# Patient Record
Sex: FEMALE | Race: WHITE | Employment: OTHER | RURAL
[De-identification: names, ages, dates, MRNs, and addresses within clinical notes are randomized per-mention and may not be internally consistent; named-entity substitution may affect disease eponyms.]

---

## 2018-03-13 ENCOUNTER — OFFICE VISIT (OUTPATIENT)
Dept: FAMILY MEDICINE CLINIC | Age: 63
End: 2018-03-13

## 2018-03-13 VITALS
SYSTOLIC BLOOD PRESSURE: 125 MMHG | WEIGHT: 133 LBS | RESPIRATION RATE: 17 BRPM | HEART RATE: 98 BPM | BODY MASS INDEX: 22.71 KG/M2 | HEIGHT: 64 IN | TEMPERATURE: 98.3 F | DIASTOLIC BLOOD PRESSURE: 70 MMHG | OXYGEN SATURATION: 98 %

## 2018-03-13 DIAGNOSIS — F41.9 ANXIETY: ICD-10-CM

## 2018-03-13 DIAGNOSIS — R42 VERTIGO: Primary | ICD-10-CM

## 2018-03-13 DIAGNOSIS — Z12.39 SCREENING FOR MALIGNANT NEOPLASM OF BREAST: ICD-10-CM

## 2018-03-13 DIAGNOSIS — F33.9 RECURRENT DEPRESSION (HCC): ICD-10-CM

## 2018-03-13 DIAGNOSIS — Z78.0 POSTMENOPAUSAL: ICD-10-CM

## 2018-03-13 DIAGNOSIS — Z00.00 WELL WOMAN EXAM WITHOUT GYNECOLOGICAL EXAM: ICD-10-CM

## 2018-03-13 RX ORDER — MECLIZINE HYDROCHLORIDE 25 MG/1
25 TABLET ORAL
Qty: 30 TAB | Refills: 2 | Status: SHIPPED | OUTPATIENT
Start: 2018-03-13 | End: 2018-03-23

## 2018-03-13 RX ORDER — ESTRADIOL 0.1 MG/G
CREAM VAGINAL
Qty: 42.5 G | Refills: 11 | Status: SHIPPED | OUTPATIENT
Start: 2018-03-13 | End: 2018-05-16 | Stop reason: SDUPTHER

## 2018-03-13 RX ORDER — VALACYCLOVIR HYDROCHLORIDE 500 MG/1
500 TABLET, FILM COATED ORAL
Qty: 60 TAB | Refills: 2 | Status: SHIPPED | OUTPATIENT
Start: 2018-03-13 | End: 2018-05-16 | Stop reason: SDUPTHER

## 2018-03-13 RX ORDER — VALACYCLOVIR HYDROCHLORIDE 500 MG/1
TABLET, FILM COATED ORAL 2 TIMES DAILY
COMMUNITY
End: 2018-03-13 | Stop reason: SDUPTHER

## 2018-03-13 RX ORDER — CITALOPRAM 10 MG/1
10 TABLET ORAL DAILY
Qty: 30 TAB | Refills: 5 | Status: SHIPPED | OUTPATIENT
Start: 2018-03-13 | End: 2018-03-27 | Stop reason: SDUPTHER
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
## 2018-03-13 NOTE — PATIENT INSTRUCTIONS
Epley Maneuver at Home for Vertigo: Exercises  Your Care Instructions  Vertigo is a spinning or whirling sensation when you move your head. Your doctor may have moved you in different positions to help your vertigo get better faster. This is called the Epley maneuver. Your doctor also may have asked you to do these exercises at home. Do the exercises as often as your doctor recommends. If your vertigo is getting worse, your doctor may have you change the exercise or stop it. How to do the exercises  Step 1    1. Sit on the edge of a bed or sofa. Step 2    1. Turn your head 45 degrees in the direction your doctor told you to. This may be toward the ear that causes the most vertigo for you. Step 3    1. Tilt yourself backward until you are lying on your back. Your head should still be at a 45-degree turn. Your head should be about midway between looking straight ahead and looking out to your side. Hold for 30 seconds. If you have vertigo, stay in this position until it stops. Step 4    1. Turn your head 90 degrees toward the ear that has the least vertigo. The point of your chin should be over your shoulder. Hold for 30 seconds. Step 5    1. Roll onto the side of the ear with the least vertigo. You should now be looking at the floor. Follow-up care is a key part of your treatment and safety. Be sure to make and go to all appointments, and call your doctor if you are having problems. It's also a good idea to know your test results and keep a list of the medicines you take. Where can you learn more? Go to http://josé-nallely.info/. Enter 470 [id number] in the search box to learn more about \"Epley Maneuver at Home for Vertigo: Exercises. \"  Current as of: October 14, 2016  Content Version: 11.4  © 6067-3667 Healthwise, [employer]. Care instructions adapted under license by [employer] (which disclaims liability or warranty for this information).  If you have questions about a medical condition or this instruction, always ask your healthcare professional. [place] [address] any warranty or liability for your use of this information.

## 2018-03-13 NOTE — MR AVS SNAPSHOT
[address] [address] [phone] Patient: Jie Marshall MRN: [id number] AIA:[birth year] Visit Information Date & Time Provider Department Dept. Phone Encounter #  
 3/13/2018  2:00 PM Nina Soto NP [id number] [address] [id number] Follow-up Instructions Return in about 2 weeks (around 3/27/2018). Follow-up and Disposition History Allergies as of 3/13/2018  Review Complete On: 3/13/2018 By: Nina Soto NP Severity Noted Reaction Type Reactions Codeine  03/13/2018    Other (comments) Flu like symptoms Current Immunizations  Never Reviewed No immunizations on file. Not reviewed this visit You Were Diagnosed With   
  
 Codes Comments Vertigo    -  Primary ICD-10-CM: H09 ICD-9-CM: 780.4 Well woman exam without gynecological exam     ICD-10-CM: Z00.00 ICD-9-CM: V70.0 Anxiety     ICD-10-CM: F41.9 ICD-9-CM: 300.00 Recurrent depression ([facility]     ICD-10-CM: F33.9 ICD-9-CM: 296.30 Postmenopausal     ICD-10-CM: Z78.0 ICD-9-CM: V49.81 Screening for malignant neoplasm of breast     ICD-10-CM: Z12.31 
ICD-9-CM: V76.10 Vitals BP Pulse Temp Resp Height(growth percentile) Weight(growth percentile) 125/70 (BP 1 Location: Right arm, BP Patient Position: Sitting) 98 98.3 °F (36.8 °C) (Oral) 17 5' 3.5\" (1.613 m) 133 lb (60.3 kg) SpO2 BMI Smoking Status 98% 23.19 kg/m2 Never Smoker Vitals History BMI and BSA Data Body Mass Index Body Surface Area  
 23.19 kg/m 2 1.64 m 2 Preferred Pharmacy Pharmacy Name Phone [id number] [address] [place] [phone] Your Updated Medication List  
  
   
This list is accurate as of 3/13/18  2:44 PM.  Always use your most recent med list.  
  
  
  
  
 citalopram 10 mg tablet Commonly known as:  David Dempsey Take 1 Tab by mouth daily. Indications: ANXIETY WITH DEPRESSION  
  
 estradiol 0.01 % (0.1 mg/gram) vaginal cream  
Commonly known as:  ESTRACE Apply to affected area every day for the first 2 weeks, then 2 times per week after that  
  
 meclizine 25 mg tablet Commonly known as:  ANTIVERT Take 1 Tab by mouth three (3) times daily as needed for up to 10 days. Indications: Vertigo  
  
 valACYclovir 500 mg tablet Commonly known as:  VALTREX Take 1 Tab by mouth two (2) times daily as needed. Prescriptions Sent to Pharmacy Refills  
 estradiol (ESTRACE) 0.01 % (0.1 mg/gram) vaginal cream 11 Sig: Apply to affected area every day for the first 2 weeks, then 2 times per week after that Class: Normal  
 Pharmacy: [facility] #[id number] - [place] - [address] Ph #: [phone]  
 valACYclovir (VALTREX) 500 mg tablet 2 Sig: Take 1 Tab by mouth two (2) times daily as needed. Class: Normal  
 Pharmacy: [facility]/pharmacy #[id number] [place], [address] Ph #: [phone] Route: Oral  
 meclizine (ANTIVERT) 25 mg tablet 2 Sig: Take 1 Tab by mouth three (3) times daily as needed for up to 10 days. Indications: Vertigo Class: Normal  
 [id number] [place], [address] Ph #: [phone] Route: Oral  
 citalopram (CELEXA) 10 mg tablet 5 Sig: Take 1 Tab by mouth daily. Indications: ANXIETY WITH DEPRESSION Class: Normal  
 [id number] [place], [address] Ph #: [phone] Route: Oral  
  
We Performed the Following CBC WITH AUTOMATED DIFF [06468 CPT(R)] LIPID PANEL [95980 CPT(R)] METABOLIC PANEL, COMPREHENSIVE [27563 CPT(R)] REFERRAL TO GYNECOLOGY [REF30 Custom] Comments:  
 Wants to establish care with gynecology. Needs pap. TSH 3RD GENERATION [60828 CPT(R)] Follow-up Instructions Return in about 2 weeks (around 3/27/2018). To-Do List   
 03/13/2018 Imaging:  DEXA BONE DENSITY STUDY AXIAL   
  
 03/13/2018 Imaging:  SULEMA MAMMO BI SCREENING INCL CAD Referral Information Referral ID Referred By Referred To  
  
 [id number] [facility] [place], [address] Phone: [phone] Fax: [phone] Visits Status Start Date End Date 1 New Request 3/13/18 3/13/19 If your referral has a status of pending review or denied, additional information will be sent to support the outcome of this decision. Patient Instructions Epley Maneuver at Home for Vertigo: Exercises Your Care Instructions Vertigo is a spinning or whirling sensation when you move your head. Your doctor may have moved you in different positions to help your vertigo get better faster. This is called the Epley maneuver. Your doctor also may have asked you to do these exercises at home. Do the exercises as often as your doctor recommends. If your vertigo is getting worse, your doctor may have you change the exercise or stop it. How to do the exercises Step 1 1. Sit on the edge of a bed or sofa. Step 2 1. Turn your head 45 degrees in the direction your doctor told you to. This may be toward the ear that causes the most vertigo for you. Step 3 1. Tilt yourself backward until you are lying on your back. Your head should still be at a 45-degree turn. Your head should be about midway between looking straight ahead and looking out to your side. Hold for 30 seconds. If you have vertigo, stay in this position until it stops. Step 4 1. Turn your head 90 degrees toward the ear that has the least vertigo. The point of your chin should be over your shoulder. Hold for 30 seconds. Step 5 1. Roll onto the side of the ear with the least vertigo. You should now be looking at the floor. Follow-up care is a key part of your treatment and safety. Be sure to make and go to all appointments, and call your doctor if you are having problems. It's also a good idea to know your test results and keep a list of the medicines you take. Where can you learn more? Go to http://josé-nallely.info/. Enter 470 9627 in the search box to learn more about \"Epley Maneuver at Home for Vertigo: Exercises. \" Current as of: October 14, 2016 Content Version: 11.4 © 4502-2752 [employer]. Care instructions adapted under license by [employer] (which disclaims liability or warranty for this information). If you have questions about a medical condition or this instruction, always ask your healthcare professional. [address] any warranty or liability for your use of this information. Introducing [facility]! Ricardo Cooper introduces CelluF[employer] patient portal. Now you can access parts of your medical record, email your doctor's office, and request medication refills online. 1. In your internet browser, go to [email or web address] 2. Click on the First Time User? Click Here link in the Sign In box. You will see the New Member Sign Up page. 3. Enter your [employer] Access Code exactly as it appears below. You will not need to use this code after youve completed the sign-up process. If you do not sign up before the expiration date, you must request a new code. · [employer] Access Code: [email or web address] Expires: 6/11/2018  1:46 PM 
 
4. Enter the last four digits of your Social Security Number (xxxx) and Date of Birth (mm/dd/yyyy) as indicated and click Submit. You will be taken to the next sign-up page. 5. Create a Facisharet ID. This will be your [employer] login ID and cannot be changed, so think of one that is secure and easy to remember. 6. Create a Facisharet password. You can change your password at any time. 7. Enter your Password Reset Question and Answer. This can be used at a later time if you forget your password. 8. Enter your e-mail address. You will receive e-mail notification when new information is available in [address] 9. Click Sign Up. You can now view and download portions of your medical record. 10. Click the Download Summary menu link to download a portable copy of your medical information. If you have questions, please visit the Frequently Asked Questions section of the TEXbase website. Remember, [employer]base is NOT to be used for urgent needs. For medical emergencies, dial 911. Now available from your iPhone and Android! Please provide this summary of care documentation to your next provider. If you have any questions after today's visit, please call [phone].

## 2018-03-14 ENCOUNTER — DOCUMENTATION ONLY (OUTPATIENT)
Dept: FAMILY MEDICINE CLINIC | Age: 63
End: 2018-03-14

## 2018-03-14 LAB
ALBUMIN SERPL-MCNC: 4.6 G/DL (ref 3.6–4.8)
ALBUMIN/GLOB SERPL: 1.8 {RATIO} (ref 1.2–2.2)
ALP SERPL-CCNC: 75 IU/L (ref 39–117)
ALT SERPL-CCNC: 26 IU/L (ref 0–32)
AST SERPL-CCNC: 17 IU/L (ref 0–40)
BASOPHILS # BLD AUTO: 0 X10E3/UL (ref 0–0.2)
BASOPHILS NFR BLD AUTO: 0 %
BILIRUB SERPL-MCNC: 0.3 MG/DL (ref 0–1.2)
BUN SERPL-MCNC: 19 MG/DL (ref 8–27)
BUN/CREAT SERPL: 29 (ref 12–28)
CALCIUM SERPL-MCNC: 9.7 MG/DL (ref 8.7–10.3)
CHLORIDE SERPL-SCNC: 97 MMOL/L (ref 96–106)
CHOLEST SERPL-MCNC: 232 MG/DL (ref 100–199)
CO2 SERPL-SCNC: 23 MMOL/L (ref 18–29)
CREAT SERPL-MCNC: 0.65 MG/DL (ref 0.57–1)
EOSINOPHIL # BLD AUTO: 0 X10E3/UL (ref 0–0.4)
EOSINOPHIL NFR BLD AUTO: 0 %
ERYTHROCYTE [DISTWIDTH] IN BLOOD BY AUTOMATED COUNT: 13.2 % (ref 12.3–15.4)
GFR SERPLBLD CREATININE-BSD FMLA CKD-EPI: 108 ML/MIN/1.73
GFR SERPLBLD CREATININE-BSD FMLA CKD-EPI: 93 ML/MIN/1.73
GLOBULIN SER CALC-MCNC: 2.6 G/DL (ref 1.5–4.5)
GLUCOSE SERPL-MCNC: 71 MG/DL (ref 65–99)
HCT VFR BLD AUTO: 36.8 % (ref 34–46.6)
HDLC SERPL-MCNC: 98 MG/DL
HGB BLD-MCNC: 12.5 G/DL (ref 11.1–15.9)
IMM GRANULOCYTES # BLD: 0 X10E3/UL (ref 0–0.1)
IMM GRANULOCYTES NFR BLD: 0 %
LDLC SERPL CALC-MCNC: 122 MG/DL (ref 0–99)
LYMPHOCYTES # BLD AUTO: 1.4 X10E3/UL (ref 0.7–3.1)
LYMPHOCYTES NFR BLD AUTO: 19 %
MCH RBC QN AUTO: 30.8 PG (ref 26.6–33)
MCHC RBC AUTO-ENTMCNC: 34 G/DL (ref 31.5–35.7)
MCV RBC AUTO: 91 FL (ref 79–97)
MONOCYTES # BLD AUTO: 0.5 X10E3/UL (ref 0.1–0.9)
MONOCYTES NFR BLD AUTO: 6 %
NEUTROPHILS # BLD AUTO: 5.6 X10E3/UL (ref 1.4–7)
NEUTROPHILS NFR BLD AUTO: 75 %
PLATELET # BLD AUTO: 344 X10E3/UL (ref 150–379)
POTASSIUM SERPL-SCNC: 4.5 MMOL/L (ref 3.5–5.2)
PROT SERPL-MCNC: 7.2 G/DL (ref 6–8.5)
RBC # BLD AUTO: 4.06 X10E6/UL (ref 3.77–5.28)
SODIUM SERPL-SCNC: 137 MMOL/L (ref 134–144)
TRIGL SERPL-MCNC: 58 MG/DL (ref 0–149)
TSH SERPL DL<=0.005 MIU/L-ACNC: 1.23 UIU/ML (ref 0.45–4.5)
VLDLC SERPL CALC-MCNC: 12 MG/DL (ref 5–40)
WBC # BLD AUTO: 7.5 X10E3/UL (ref 3.4–10.8)

## 2018-03-15 ENCOUNTER — TELEPHONE (OUTPATIENT)
Dept: FAMILY MEDICINE CLINIC | Age: 63
End: 2018-03-15

## 2018-03-15 NOTE — TELEPHONE ENCOUNTER
----- Message from Vira Ritchie NP sent at 3/15/2018  7:28 AM EDT -----  Liver, kidneys and electrolytes look good. Cholesterol is decent. There is some room for improvement. Blood count looks good. Thyroid level is within range.

## 2018-03-15 NOTE — PROGRESS NOTES
Liver, kidneys and electrolytes look good. Cholesterol is decent. There is some room for improvement. Blood count looks good. Thyroid level is within range.

## 2018-03-15 NOTE — TELEPHONE ENCOUNTER
Unable to reach patient by phone to give lab results. Left message on voicemail to return my call at her earliest convenience.

## 2018-03-26 ENCOUNTER — TELEPHONE (OUTPATIENT)
Dept: FAMILY MEDICINE CLINIC | Age: 63
End: 2018-03-26

## 2018-03-26 DIAGNOSIS — M81.0 AGE-RELATED OSTEOPOROSIS WITHOUT CURRENT PATHOLOGICAL FRACTURE: Primary | ICD-10-CM

## 2018-03-26 RX ORDER — ALENDRONATE SODIUM 70 MG/1
70 TABLET ORAL
Qty: 12 TAB | Refills: 3 | Status: SHIPPED | OUTPATIENT
Start: 2018-03-26 | End: 2018-11-26 | Stop reason: SDUPTHER

## 2018-03-26 NOTE — TELEPHONE ENCOUNTER
----- Message from Maci Palmer NP sent at 3/26/2018 12:01 PM EDT -----  Bone density scan indicates that you currently fall into the osteoporosis category. Per your scoring, your risk of a major osteoporotic fracture over the next 10 years is 13% and your risk of hip fracture over the next 10 years is 3.1%. I'm going to send in Fosamax for you. Take it once a week with a full glass of water. We usually treat for 5 years.

## 2018-03-27 ENCOUNTER — OFFICE VISIT (OUTPATIENT)
Dept: FAMILY MEDICINE CLINIC | Age: 63
End: 2018-03-27

## 2018-03-27 VITALS
DIASTOLIC BLOOD PRESSURE: 62 MMHG | WEIGHT: 133.8 LBS | SYSTOLIC BLOOD PRESSURE: 120 MMHG | OXYGEN SATURATION: 99 % | BODY MASS INDEX: 22.84 KG/M2 | RESPIRATION RATE: 17 BRPM | HEIGHT: 64 IN | HEART RATE: 66 BPM

## 2018-03-27 DIAGNOSIS — M81.0 AGE-RELATED OSTEOPOROSIS WITHOUT CURRENT PATHOLOGICAL FRACTURE: ICD-10-CM

## 2018-03-27 DIAGNOSIS — F33.9 RECURRENT DEPRESSION (HCC): ICD-10-CM

## 2018-03-27 DIAGNOSIS — Z00.00 MEDICARE ANNUAL WELLNESS VISIT, INITIAL: Primary | ICD-10-CM

## 2018-03-27 RX ORDER — CITALOPRAM 20 MG/1
20 TABLET, FILM COATED ORAL DAILY
Qty: 90 TAB | Refills: 1 | Status: SHIPPED | OUTPATIENT
Start: 2018-03-27 | End: 2018-05-16 | Stop reason: SDUPTHER

## 2018-03-27 NOTE — ACP (ADVANCE CARE PLANNING)
Patient states she has an advanced directive and will bring it in on her next visit to be scanned into her chart.

## 2018-03-27 NOTE — MR AVS SNAPSHOT
[address] [facility] 
 
 
 [address] [address] [facility],5Th [address] [address] [phone] Patient: Jakub Sahni MRN: [id number] Visit Information Date & Time Provider Department Dept. Phone Encounter #  
 3/27/2018 11:00 AM Kuldeep Valero NP Florinda Mckenna [id number] Follow-up Instructions Return in about 6 months (around 9/27/2018). Follow-up and Disposition History Your Appointments 9/27/2018  1:00 PM  
ESTABLISHED PATIENT with SOLIS Kaiser 38 ([facility]) Appt Note: 6mo fu  
 [address] [address] [phone]  
  
   
 [address] [address] Upcoming Health Maintenance Date Due Hepatitis C Screening 1955 DTaP/Tdap/Td series (1 - Tdap) 2/12/1974 BREAST CANCER SCRN MAMMOGRAM 2/12/2003 FOBT Q 1 YEAR AGE 50-75 2/12/2003 ZOSTER VACCINE AGE 60> 12/12/2012 Influenza Age 5 to Adult 8/1/2017 GLAUCOMA SCREENING Q2Y 2/12/2018 Pneumococcal 65+ Low/Medium Risk (1 of 2 - PCV13) 2/12/2018 MEDICARE YEARLY EXAM 3/20/2018 Allergies as of 3/27/2018  Review Complete On: 3/27/2018 By: Kuldeep Valero NP Severity Noted Reaction Type Reactions Codeine  03/13/2018    Other (comments) Flu like symptoms Current Immunizations  Never Reviewed No immunizations on file. Not reviewed this visit You Were Diagnosed With   
  
 Codes Comments Medicare annual wellness visit, initial    -  Primary ICD-10-CM: Z00.00 ICD-9-CM: V70.0 Recurrent depression ([facility] [place] [address]     ICD-10-CM: F33.9 ICD-9-CM: 296.30 Age-related osteoporosis without current pathological fracture     ICD-10-CM: M81.0 ICD-9-CM: 733.01 Vitals BP Pulse Resp Height(growth percentile) Weight(growth percentile) SpO2  
 120/62 (BP 1 Location: Left arm, BP Patient Position: Sitting) 66 17 5' 3.5\" (1.613 m) 133 lb 12.8 oz (60.7 kg) 99% BMI OB Status Smoking Status 23.33 kg/m2 Hysterectomy Never Smoker Vitals History BMI and BSA Data Body Mass Index Body Surface Area  
 23.33 kg/m 2 1.65 m 2 Preferred Pharmacy Pharmacy Name Phone [facility]/PHARMACY #[id number] [place] [phone] Your Updated Medication List  
  
   
This list is accurate as of 3/27/18 12:09 PM.  Always use your most recent med list.  
  
  
  
  
 alendronate 70 mg tablet Commonly known as:  FOSAMAX Take 1 Tab by mouth every seven (7) days. Indications: POST-MENOPAUSAL OSTEOPOROSIS  
  
 citalopram 20 mg tablet Commonly known as:  Doyne Victor M Take 1 Tab by mouth daily. Indications: ANXIETY WITH DEPRESSION, New higher dose  
  
 estradiol 0.01 % (0.1 mg/gram) vaginal cream  
Commonly known as:  ESTRACE Apply to affected area every day for the first 2 weeks, then 2 times per week after that  
  
 valACYclovir 500 mg tablet Commonly known as:  VALTREX Take 1 Tab by mouth two (2) times daily as needed. Prescriptions Sent to Pharmacy Refills  
 citalopram (CELEXA) 20 mg tablet 1 Sig: Take 1 Tab by mouth daily. Indications: ANXIETY WITH DEPRESSION, New higher dose Class: Normal  
 Pharmacy: [facility]/pharmacy #[id number] [address] [place] Ph #: [phone] Route: Oral  
  
We Performed the Following AMB POC EKG ROUTINE W/ 12 LEADS, INTER & REP [70765 CPT(R)] Follow-up Instructions Return in about 6 months (around 9/27/2018). Patient Instructions The best way to stay healthy is to live a healthy lifestyle. A healthy lifestyle includes regular exercise, eating a well-balanced diet, keeping a healthy weight and not smoking. Regular physical exams and screening tests are another important way to take care of yourself.  Preventive exams provided by health care providers can find health problems early when treatment works best and can keep you from getting certain diseases or illnesses. Preventive services include exams, lab tests, screenings, shots, monitoring and information to help you take care of your own health. All people over 65 should have a pneumonia shot. Pneumonia shots are usually only needed once in a lifetime unless your doctor decides differently. In addition to your physical exam, some screening tests are recommended: 
 
All people over 65 should have a yearly flu shot. People over 65 are at medium to high risk for Hepatitis B. Three shots are needed for complete protection. Bone mass measurement (dexa scan) is recommended every two years. Diabetes Mellitus screening is recommended every year. Glaucoma is an eye disease caused by high pressure in the eye. An eye exam is recommended every year. Cardiovascular screening tests that check your cholesterol and other blood fat (lipid) levels are recommended every five years. Colorectal Cancer screening tests help to find pre-cancerous polyps (growths in the colon) so they can be removed before they turn into cancer. Tests ordered for screening depend on your personal and family history risk factors. Prostate Cancer Screening (annually up to age 76) Screening for breast cancer is recommended yearly with a Mammogram. 
 
Screening for cervical and vaginal cancer is recommended with a pelvic and Pap test every two years. However if you have had an abnormal pap in the past  three years or at high risk for cervical or vaginal cancer Medicare will cover a pap test and a pelvic exam every year. Here is a list of your current Health Maintenance items with a due date: 
Health Maintenance Due Topic Date Due  
 Hepatitis C Test  1955  DTaP/Tdap/Td  (1 - Tdap) 02/12/1974  Breast Cancer Screening  02/12/2003  Stool testing for trace blood  02/12/2003  Shingles Vaccine  12/12/2012  Flu Vaccine  08/01/2017  Glaucoma Screening   02/12/2018  Pneumococcal Vaccine (1 of 2 - PCV13) 02/12/2018 [facility] Annual Well Visit  03/20/2018 Osteoporosis: Care Instructions Your Care Instructions Osteoporosis causes bones to become thin and weak. It is much more common in women than in men. Osteoporosis may be very advanced before you know you have it. Sometimes the first sign is a broken bone in the hip, spine, or wrist or sudden pain in your middle or lower back. Follow-up care is a key part of your treatment and safety. Be sure to make and go to all appointments, and call your doctor if you are having problems. It's also a good idea to know your test results and keep a list of the medicines you take. How can you care for yourself at home? · Your doctor may prescribe a bisphosphonate, such as risedronate (Actonel) or alendronate (Fosamax), for osteoporosis. If you are taking one of these medicines by mouth: 
¨ Take your medicine with a full glass of water when you first get up in the morning. ¨ Do not lie down, eat, drink a beverage, or take any other medicine for at least 30 minutes after taking the drug. This helps prevent stomach problems. ¨ Do not take your medicine late in the day if you forgot to take it in the morning. Skip it, and take the usual dose the next morning. ¨ If you have side effects, tell your doctor. He or she may prescribe another medicine. · Get enough calcium and vitamin D. The [place] of Medicine recommends adults younger than age 46 need 1,000 mg of calcium and 600 IU of vitamin D each day. Women ages 46 to 79 need 1,200 mg of calcium and 600 IU of vitamin D each day. Men ages 46 to 79 need 1,000 mg of calcium and 600 IU of vitamin D each day. Adults 71 and older need 1,200 mg of calcium and 800 IU of vitamin D each day.  
¨ Eat foods rich in calcium, like yogurt, cheese, milk, and dark green vegetables. This is a good way to get the calcium you need. You can get vitamin D from eggs, fatty fish, cereal, and milk. ¨ Talk to your doctor about taking a calcium plus vitamin D supplement. Be careful, though. Adults ages 23 to 48 should not get more than 2,500 mg of calcium and 4,000 IU of vitamin D each day, whether it is from supplements and/or food. Adults ages 46 and older should not get more than 2,000 mg of calcium and 4,000 IU of vitamin D each day from supplements and/or food. · Limit alcohol to 2 drinks a day for men and 1 drink a day for women. Too much alcohol can cause health problems. · Do not smoke. Smoking puts you at a much higher risk for osteoporosis. If you need help quitting, talk to your doctor about stop-smoking programs and medicines. These can increase your chances of quitting for good. · Get regular bone-building exercise. Weight-bearing and resistance exercises keep bones healthy by working the muscles and bones against gravity. Start out at an exercise level that feels right for you. Add a little at a time until you can do the following: ¨ Do 30 minutes of weight-bearing exercise on most days of the week. Walking, jogging, stair climbing, and dancing are good choices. ¨ Do resistance exercises with weights or elastic bands 2 to 3 days a week. · Reduce your risk of falls: 
¨ Wear supportive shoes with low heels and nonslip soles. ¨ Use a cane or walker, if you need it. Use shower chairs and bath benches. Put in handrails on stairways, around your shower or tub area, and near the toilet. ¨ Keep stairs, porches, and walkways well lit. Use night-lights. ¨ Remove throw rugs and other objects that are in the way. ¨ Avoid icy, wet, or slippery surfaces. ¨ Keep a cordless phone and a flashlight with new batteries by your bed. When should you call for help? Watch closely for changes in your health, and be sure to contact your doctor if you have any problems. Where can you learn more? Go to http://josé-nallely.info/. Enter K100 in the search box to learn more about \"Osteoporosis: Care Instructions. \" Current as of: May 12, 2017 Content Version: 11.4 © 4392-6921 [employer]. Care instructions adapted under license by [employer] (which disclaims liability or warranty for this information). If you have questions about a medical condition or this instruction, always ask your healthcare professional. [address] any warranty or liability for your use of this information. Patient Instructions History Introducing [facility]! Dear Gaby Espinoza: Thank you for requesting a Endgame account. Our records indicate that you already have an active Endgame account. You can access your account anytime at [email or web address] Did you know that you can access your hospital and ER discharge instructions at any time in Endgame? You can also review all of your test results from your hospital stay or ER visit. Additional Information If you have questions, please visit the Frequently Asked Questions section of the Endgame website at [email or web address] Remember, Endgame is NOT to be used for urgent needs. For medical emergencies, dial 911. Now available from your iPhone and Android! Please provide this summary of care documentation to your next provider. Your primary care clinician is listed as Amanda Clause. If you have any questions after today's visit, please call [phone].

## 2018-03-27 NOTE — PATIENT INSTRUCTIONS
The best way to stay healthy is to live a healthy lifestyle. A healthy lifestyle includes regular exercise, eating a well-balanced diet, keeping a healthy weight and not smoking. Regular physical exams and screening tests are another important way to take care of yourself. Preventive exams provided by health care providers can find health problems early when treatment works best and can keep you from getting certain diseases or illnesses. Preventive services include exams, lab tests, screenings, shots, monitoring and information to help you take care of your own health. All people over 65 should have a pneumonia shot. Pneumonia shots are usually only needed once in a lifetime unless your doctor decides differently. In addition to your physical exam, some screening tests are recommended:    All people over 65 should have a yearly flu shot. People over 65 are at medium to high risk for Hepatitis B. Three shots are needed for complete protection. Bone mass measurement (dexa scan) is recommended every two years. Diabetes Mellitus screening is recommended every year. Glaucoma is an eye disease caused by high pressure in the eye. An eye exam is recommended every year. Cardiovascular screening tests that check your cholesterol and other blood fat (lipid) levels are recommended every five years. Colorectal Cancer screening tests help to find pre-cancerous polyps (growths in the colon) so they can be removed before they turn into cancer. Tests ordered for screening depend on your personal and family history risk factors. Prostate Cancer Screening (annually up to age 76)    Screening for breast cancer is recommended yearly with a Mammogram.    Screening for cervical and vaginal cancer is recommended with a pelvic and Pap test every two years.  However if you have had an abnormal pap in the past  three years or at high risk for cervical or vaginal cancer Medicare will cover a pap test and a pelvic exam every year. Here is a list of your current Health Maintenance items with a due date:  Health Maintenance Due   Topic Date Due    Hepatitis C Test  1955    DTaP/Tdap/Td  (1 - Tdap) 02/12/1974    Breast Cancer Screening  02/12/2003    Stool testing for trace blood  02/12/2003    Shingles Vaccine  12/12/2012    Flu Vaccine  08/01/2017    Glaucoma Screening   02/12/2018    Pneumococcal Vaccine (1 of 2 - PCV13) 02/12/2018    Annual Well Visit  03/20/2018          Osteoporosis: Care Instructions  Your Care Instructions    Osteoporosis causes bones to become thin and weak. It is much more common in women than in men. Osteoporosis may be very advanced before you know you have it. Sometimes the first sign is a broken bone in the hip, spine, or wrist or sudden pain in your middle or lower back. Follow-up care is a key part of your treatment and safety. Be sure to make and go to all appointments, and call your doctor if you are having problems. It's also a good idea to know your test results and keep a list of the medicines you take. How can you care for yourself at home? · Your doctor may prescribe a bisphosphonate, such as risedronate (Actonel) or alendronate (Fosamax), for osteoporosis. If you are taking one of these medicines by mouth:  ¨ Take your medicine with a full glass of water when you first get up in the morning. ¨ Do not lie down, eat, drink a beverage, or take any other medicine for at least 30 minutes after taking the drug. This helps prevent stomach problems. ¨ Do not take your medicine late in the day if you forgot to take it in the morning. Skip it, and take the usual dose the next morning. ¨ If you have side effects, tell your doctor. He or she may prescribe another medicine. · Get enough calcium and vitamin D. The [place] of Medicine recommends adults younger than age 46 need 1,000 mg of calcium and 600 IU of vitamin D each day.  Women ages 46 to 79 need 1,200 mg of calcium and 600 IU of vitamin D each day. Men ages 46 to 79 need 1,000 mg of calcium and 600 IU of vitamin D each day. Adults 71 and older need 1,200 mg of calcium and 800 IU of vitamin D each day. ¨ Eat foods rich in calcium, like yogurt, cheese, milk, and dark green vegetables. This is a good way to get the calcium you need. You can get vitamin D from eggs, fatty fish, cereal, and milk. ¨ Talk to your doctor about taking a calcium plus vitamin D supplement. Be careful, though. Adults ages 23 to 48 should not get more than 2,500 mg of calcium and 4,000 IU of vitamin D each day, whether it is from supplements and/or food. Adults ages 46 and older should not get more than 2,000 mg of calcium and 4,000 IU of vitamin D each day from supplements and/or food. · Limit alcohol to 2 drinks a day for men and 1 drink a day for women. Too much alcohol can cause health problems. · Do not smoke. Smoking puts you at a much higher risk for osteoporosis. If you need help quitting, talk to your doctor about stop-smoking programs and medicines. These can increase your chances of quitting for good. · Get regular bone-building exercise. Weight-bearing and resistance exercises keep bones healthy by working the muscles and bones against gravity. Start out at an exercise level that feels right for you. Add a little at a time until you can do the following:  ¨ Do 30 minutes of weight-bearing exercise on most days of the week. Walking, jogging, stair climbing, and dancing are good choices. ¨ Do resistance exercises with weights or elastic bands 2 to 3 days a week. · Reduce your risk of falls:  ¨ Wear supportive shoes with low heels and nonslip soles. ¨ Use a cane or walker, if you need it. Use shower chairs and bath benches. Put in handrails on stairways, around your shower or tub area, and near the toilet. ¨ Keep stairs, porches, and walkways well lit. Use night-lights.   ¨ Remove throw rugs and other objects that are in the way.  ¨ Avoid icy, wet, or slippery surfaces. ¨ Keep a cordless phone and a flashlight with new batteries by your bed. When should you call for help? Watch closely for changes in your health, and be sure to contact your doctor if you have any problems. Where can you learn more? Go to http://josé-nallely.info/. Enter K100 in the search box to learn more about \"Osteoporosis: Care Instructions. \"  Current as of: May 12, 2017  Content Version: 11.4  © 0588-6544 [employer]. Care instructions adapted under license by [email or web address] (which disclaims liability or warranty for this information). If you have questions about a medical condition or this instruction, always ask your healthcare professional. [address] any warranty or liability for your use of this information.

## 2018-03-27 NOTE — PROGRESS NOTES
Jie Marshall is a 70 y.o. female and presents for annual Medicare Wellness Visit. Problem List: Reviewed with patient and discussed risk factors. Patient Active Problem List   Diagnosis Code    Vertigo R42    [facility] F33.9    Age-related osteoporosis without current pathological fracture M81.0       Current medical providers:  Patient Care Team:  Nina Soto NP as PCP - General (Nurse Practitioner)    PSH: Reviewed with patient  Past Surgical History:   Procedure Laterality Date    HX KNEE REPLACEMENT Left 05/05/2009        SH: Reviewed with patient  Social History   Substance Use Topics    Smoking status: Never Smoker    Smokeless tobacco: Never Used    Alcohol use No       FH: Reviewed with patient  Family History   Problem Relation Age of Onset    Heart Attack Father     Cancer Sister        Medications/Allergies: Reviewed with patient  Current Outpatient Prescriptions on File Prior to Visit   Medication Sig Dispense Refill    estradiol (ESTRACE) 0.01 % (0.1 mg/gram) vaginal cream Apply to affected area every day for the first 2 weeks, then 2 times per week after that 42.5 g 11    valACYclovir (VALTREX) 500 mg tablet Take 1 Tab by mouth two (2) times daily as needed. 60 Tab 2    citalopram (CELEXA) 10 mg tablet Take 1 Tab by mouth daily. Indications: ANXIETY WITH DEPRESSION 30 Tab 5    alendronate (FOSAMAX) 70 mg tablet Take 1 Tab by mouth every seven (7) days. Indications: POST-MENOPAUSAL OSTEOPOROSIS 12 Tab 3     No current facility-administered medications on file prior to visit. Allergies   Allergen Reactions    Codeine Other (comments)     Flu like symptoms       Objective:  Visit Vitals    /62 (BP 1 Location: Left arm, BP Patient Position: Sitting)    Pulse 66    Resp 17    Ht 5' 3.5\" (1.613 m)    Wt 133 lb 12.8 oz (60.7 kg)    SpO2 99%    BMI 23.33 kg/m2    Body mass index is 23.33 kg/(m^2).     Assessment of cognitive impairment: Alert and oriented x 3    Depression Screen:   PHQ over the last two weeks 3/27/2018   Little interest or pleasure in doing things Not at all   Feeling down, depressed or hopeless Not at all   Total Score PHQ 2 0       Fall Risk Assessment:    Fall Risk Assessment, last 12 mths 3/27/2018   Able to walk? Yes   Fall in past 12 months? No       Functional Ability:   Does the patient exhibit a steady gait? yes   How long did it take the patient to get up and walk from a sitting position? 5 seconds   Is the patient self reliant?  (ie can do own laundry, meals, household chores)  yes     Does the patient handle his/her own medications? yes     Does the patient handle his/her own money? yes     Is the patients home safe (ie good lighting, handrails on stairs and bath, etc.)? yes     Did you notice or did patient express any hearing difficulties? no     Did you notice or did patient express any vision difficulties?   no     Were distance and reading eye charts used? no       Advance Care Planning:   Patient was offered the opportunity to discuss advance care planning:  yes     Does patient have an Advance Directive:  yes   If no, did you provide information on Caring Connections? yes       Plan:      Orders Placed This Encounter    AMB POC EKG ROUTINE W/ 12 LEADS, INTER & REP       Health Maintenance   Topic Date Due    Hepatitis C Screening  1955    DTaP/Tdap/Td series (1 - Tdap) 02/12/1974    BREAST CANCER SCRN MAMMOGRAM  02/12/2003    FOBT Q 1 YEAR AGE 50-75  02/12/2003    ZOSTER VACCINE AGE 60>  12/12/2012    Influenza Age 5 to Adult  08/01/2017    GLAUCOMA SCREENING Q2Y  02/12/2018    Pneumococcal 65+ Low/Medium Risk (1 of 2 - PCV13) 02/12/2018    MEDICARE YEARLY EXAM  03/20/2018    Bone Densitometry (Dexa) Screening  Completed       *Patient verbalized understanding and agreement with the plan. A copy of the After Visit Summary with personalized health plan was given to the patient today. ____________________________________________________________________________________________________________      Chief Complaint   Patient presents with    Annual Wellness Visit         HPI:      Nedra Carrasco is a 70 y.o. female. Moved here a month ago from [place]. Originally from [place]. 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 Left TKR   Diagnosed with Lyme's Disease in 2017    Osteoporosis:  Started on Fosamax 3/2018. Major osteoporotic fracture over the next 10 years is 13% and your risk of hip fracture over the next 10 years is 3.1%. Anxiety/Depression: She has some issues with anxiety and depression. Has had it her all life. She did some binge eating in the past to cope. Also drank in the past.  Got a DUI. 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 Has quit for a while, but then will start again. 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 Sister  of lung cancer  Father had MI (age 80)  [de-identified] with breast cancer    New Issues:  Has picked up her medication for osteoporosis. Has been doing Ok on the Celexa. Her \"dark cloud\" has seemed slightly lighter in the morning. Her vertigo and vaginal dryness are much better. Plan to check Vit. D with next labs. Allergies   Allergen Reactions    Codeine Other (comments)     Flu like symptoms       Current Outpatient Prescriptions   Medication Sig    estradiol (ESTRACE) 0.01 % (0.1 mg/gram) vaginal cream Apply to affected area every day for the first 2 weeks, then 2 times per week after that    valACYclovir (VALTREX) 500 mg tablet Take 1 Tab by mouth two (2) times daily as needed.  citalopram (CELEXA) 10 mg tablet Take 1 Tab by mouth daily. Indications: ANXIETY WITH DEPRESSION    alendronate (FOSAMAX) 70 mg tablet Take 1 Tab by mouth every seven (7) days. Indications: POST-MENOPAUSAL OSTEOPOROSIS     No current facility-administered medications for this visit.         Past Medical History:   Diagnosis Date    Menopause        Past Surgical History:   Procedure Laterality Date    HX KNEE REPLACEMENT Left 05/05/2009       Social History     Social History    Marital status: SINGLE     Spouse name: N/A    Number of children: N/A    Years of education: N/A     Social History Main Topics    Smoking status: Never Smoker    Smokeless tobacco: Never Used    Alcohol use No    Drug use: No    Sexual activity: Yes     Partners: Male     Other Topics Concern     Service No    Blood Transfusions No    Caffeine Concern No    Occupational Exposure No    Hobby Hazards No    Sleep Concern No    Stress Concern No    Weight Concern No    Special Diet No    Back Care Yes    Exercise Yes    Bike Helmet Yes    Seat Belt Yes    Self-Exams Yes     Social History Narrative       Family History   Problem Relation Age of Onset    Heart Attack Father     Cancer Sister        Above history reviewed. ROS:  Denies fever, chills, cough, chest pain, SOB,  nausea, vomiting, or diarrhea. Denies wt loss, wt gain, hemoptysis, hematochezia or melena. Physical Examination:    /62 (BP 1 Location: Left arm, BP Patient Position: Sitting)  Pulse 66  Resp 17  Ht 5' 3.5\" (1.613 m)  Wt 133 lb 12.8 oz (60.7 kg)  SpO2 99%  BMI 23.33 kg/m2    General: Alert and Ox3, Fluent speech  Neck:  Supple, no adenopathy, JVD, mass or bruit  Chest:  Clear to Ausculation, without wheezes, rales, rubs or ronchi  Cardiac: RRR  Extremities:  No cyanosis, clubbing or edema  Neurologic:  Ambulatory without assist, CN 2-12 grossly intact. Moves all extremities. Skin: no rash  Lymphadenopathy: no cervical or supraclavicular nodes    ASSESSMENT AND PLAN:     1. Medicare annual wellness visit, initial  Obtaining records from outside facilities  - AMB POC EKG ROUTINE W/ 12 LEADS, INTER & REP    2. Recurrent depression ([facility]  Increasing dose  - citalopram (CELEXA) 20 mg tablet; Take 1 Tab by mouth daily.  Indications: ANXIETY WITH DEPRESSION, New higher dose  Dispense: 90 Tab; Refill: 1    3.  Age-related osteoporosis without current pathological fracture  educated on medication    RTC PRN    Frank Dean NP

## 2018-03-28 ENCOUNTER — TELEPHONE (OUTPATIENT)
Dept: FAMILY MEDICINE CLINIC | Age: 63
End: 2018-03-28

## 2018-03-28 NOTE — TELEPHONE ENCOUNTER
[phone] contact # per Jose Course, please have [place] give me a call as I have a few questions concerning visit on yesterday, 03/27/2018.     Thanks,

## 2018-03-30 ENCOUNTER — TELEPHONE (OUTPATIENT)
Dept: FAMILY MEDICINE CLINIC | Age: 63
End: 2018-03-30

## 2018-03-30 NOTE — TELEPHONE ENCOUNTER
Would like to have Deb Verduzco to call her.  She has a couple of questions MG disintegrating tablet Take 1 tablet by mouth every 8 hours as needed for Nausea 15 tablet 0      No current facility-administered medications for this visit. No Known Allergies     Review of Systems:       Review of Systems   Constitutional: Negative for activity change, chills and diaphoresis. HENT: Negative for congestion, drooling and facial swelling. Eyes: Negative for pain and discharge. Respiratory: Negative for apnea, shortness of breath and stridor. Cardiovascular: Negative for chest pain, palpitations and leg swelling. Gastrointestinal: Negative for abdominal distention, abdominal pain and blood in stool. Endocrine: Negative for cold intolerance, heat intolerance and polydipsia. Genitourinary: Negative for difficulty urinating, dysuria and flank pain. Musculoskeletal: Negative for arthralgias, back pain and joint swelling. Skin: Positive for wound. Allergic/Immunologic: Negative. Neurological: Negative for dizziness, facial asymmetry and light-headedness. Hematological: Negative. Psychiatric/Behavioral: Negative for agitation, confusion and decreased concentration.      OBJECTIVE:  Physical Exam:    /78   Ht 6' 0.01\" (1.829 m)   Wt 160 lb 0.9 oz (72.6 kg)   BMI 21.70 kg/m²       Physical Exam   Constitutional: He is oriented to person, place, and time. He appears well-developed and well-nourished. No distress. Abdominal: Soft. Bowel sounds are normal. He exhibits no distension and no mass. There is no tenderness. There is no rebound and no guarding. Neurological: He is alert and oriented to person, place, and time. Skin: Skin is warm and dry. No rash noted. He is not diaphoretic. No erythema. No pallor. Left medial buttock Abscess healed, not draining purulent material, erythema minimal, minimal inflammation surrounding.     Psychiatric: He has a normal mood and affect.      ASSESSMENT:  abscess     PLAN:  Treatment:   Increase activity as

## 2018-05-08 ENCOUNTER — TELEPHONE (OUTPATIENT)
Dept: FAMILY MEDICINE CLINIC | Age: 63
End: 2018-05-08

## 2018-05-08 DIAGNOSIS — G89.29 OTHER CHRONIC PAIN: Primary | ICD-10-CM

## 2018-05-08 NOTE — TELEPHONE ENCOUNTER
[phone] contact # per Jose F Huff please ask Mathew Loo to complete a referral  to a back specialists in the area. Should she have any questions, please give me a call back @ [phone].     Thanks,

## 2018-05-16 DIAGNOSIS — F33.9 RECURRENT DEPRESSION (HCC): ICD-10-CM

## 2018-05-16 DIAGNOSIS — Z78.0 POSTMENOPAUSAL: ICD-10-CM

## 2018-05-17 DIAGNOSIS — Z78.0 POSTMENOPAUSAL: ICD-10-CM

## 2018-05-17 DIAGNOSIS — F33.9 RECURRENT DEPRESSION (HCC): ICD-10-CM

## 2018-05-17 RX ORDER — ESTRADIOL 0.1 MG/G
CREAM VAGINAL
Qty: 42.5 G | Refills: 11 | Status: SHIPPED | OUTPATIENT
Start: 2018-05-17 | End: 2018-12-13 | Stop reason: SDUPTHER

## 2018-05-17 RX ORDER — ESTRADIOL 0.1 MG/G
CREAM VAGINAL
Qty: 42.5 G | Refills: 11 | Status: SHIPPED | OUTPATIENT
Start: 2018-05-17 | End: 2018-05-17 | Stop reason: SDUPTHER

## 2018-05-17 RX ORDER — CITALOPRAM 20 MG/1
20 TABLET, FILM COATED ORAL DAILY
Qty: 90 TAB | Refills: 1 | Status: SHIPPED | OUTPATIENT
Start: 2018-05-17 | End: 2018-05-24 | Stop reason: SDUPTHER

## 2018-05-17 RX ORDER — VALACYCLOVIR HYDROCHLORIDE 500 MG/1
500 TABLET, FILM COATED ORAL
Qty: 60 TAB | Refills: 2 | Status: SHIPPED | OUTPATIENT
Start: 2018-05-17 | End: 2018-05-17 | Stop reason: SDUPTHER

## 2018-05-17 RX ORDER — VALACYCLOVIR HYDROCHLORIDE 500 MG/1
500 TABLET, FILM COATED ORAL
Qty: 60 TAB | Refills: 2 | Status: SHIPPED | OUTPATIENT
Start: 2018-05-17 | End: 2020-12-08 | Stop reason: SDUPTHER

## 2018-05-17 RX ORDER — CITALOPRAM 20 MG/1
20 TABLET, FILM COATED ORAL DAILY
Qty: 90 TAB | Refills: 1 | Status: SHIPPED | OUTPATIENT
Start: 2018-05-17 | End: 2018-05-17 | Stop reason: SDUPTHER

## 2018-05-24 ENCOUNTER — TELEPHONE (OUTPATIENT)
Dept: FAMILY MEDICINE CLINIC | Age: 63
End: 2018-05-24

## 2018-05-24 DIAGNOSIS — F33.9 RECURRENT DEPRESSION (HCC): ICD-10-CM

## 2018-05-24 RX ORDER — CITALOPRAM 20 MG/1
20 TABLET, FILM COATED ORAL DAILY
Qty: 90 TAB | Refills: 1 | Status: SHIPPED | OUTPATIENT
Start: 2018-05-24 | End: 2018-10-01 | Stop reason: SDUPTHER

## 2018-05-28 ENCOUNTER — TELEPHONE (OUTPATIENT)
Dept: FAMILY MEDICINE CLINIC | Age: 63
End: 2018-05-28

## 2018-05-28 NOTE — TELEPHONE ENCOUNTER
Pt calling concerned of recent tick bite. Unsure how long attached. Pulled off 10 days ago. Started to get sxs that are mild, but similar to her h/o Lyme Dz. Has noticed a gradual onset of sporadic nonspecific jt aches, feeling tired, bite is a little red but not quite spread just yet, no large or diffuse rash. No fever or other sxs. Just wanted to know what protocol is here. Previously tx in [place]. Wants to come in tomorrow for potential treatment, understands testing probably not helpful at this point and would not want to wait for labs and sxs to get worse prior to tx. Will call first thing in AM for appt and will go to Urgent care for tx today if feels her sxs are getting any worse or decides to try to get abx today (told sooner the better).

## 2018-05-29 ENCOUNTER — OFFICE VISIT (OUTPATIENT)
Dept: FAMILY MEDICINE CLINIC | Age: 63
End: 2018-05-29

## 2018-05-29 ENCOUNTER — TELEPHONE (OUTPATIENT)
Dept: FAMILY MEDICINE CLINIC | Age: 63
End: 2018-05-29

## 2018-05-29 VITALS
TEMPERATURE: 97.8 F | RESPIRATION RATE: 17 BRPM | HEIGHT: 64 IN | SYSTOLIC BLOOD PRESSURE: 115 MMHG | DIASTOLIC BLOOD PRESSURE: 65 MMHG | BODY MASS INDEX: 22.43 KG/M2 | WEIGHT: 131.4 LBS | OXYGEN SATURATION: 98 % | HEART RATE: 73 BPM

## 2018-05-29 DIAGNOSIS — A69.20 LYME DISEASE: Primary | ICD-10-CM

## 2018-05-29 DIAGNOSIS — M54.9 BACK PAIN, UNSPECIFIED BACK LOCATION, UNSPECIFIED BACK PAIN LATERALITY, UNSPECIFIED CHRONICITY: ICD-10-CM

## 2018-05-29 RX ORDER — DOXYCYCLINE 100 MG/1
100 TABLET ORAL 2 TIMES DAILY
Qty: 42 TAB | Refills: 0 | Status: SHIPPED | OUTPATIENT
Start: 2018-05-29 | End: 2018-06-19

## 2018-05-29 NOTE — MR AVS SNAPSHOT
[address] [facility] [address] 
 
 
 [address] [address] [facility],[address] [address] [phone] Patient: Gerald Dave MRN: [id number] Visit Information Date & Time Provider Department Dept. Phone Encounter #  
 5/29/2018  1:15 PM Yolanda Cooney NP Florinda Mckenna [id number] Follow-up Instructions Return if symptoms worsen or fail to improve. Follow-up and Disposition History Your Appointments 9/27/2018  1:00 PM  
ESTABLISHED PATIENT with SOLIS Fitch 38 [facility] Appt Note: 6mo fu  
 [address] [address] [phone]  
  
   
 [address] [address] Upcoming Health Maintenance Date Due DTaP/Tdap/Td series (1 - Tdap) 2/12/1974 FOBT Q 1 YEAR AGE 50-75 2/12/2003 ZOSTER VACCINE AGE 60> 12/12/2012 GLAUCOMA SCREENING Q2Y 2/12/2018 Pneumococcal 65+ Low/Medium Risk (1 of 2 - PCV13) 2/12/2018 Influenza Age 5 to Adult 8/1/2018 MEDICARE YEARLY EXAM 3/28/2019 BREAST CANCER SCRN MAMMOGRAM 3/23/2020 Allergies as of 5/29/2018  Review Complete On: 5/29/2018 By: Yolanda Cooney NP Severity Noted Reaction Type Reactions Codeine  03/13/2018    Other (comments) Flu like symptoms Current Immunizations  Never Reviewed No immunizations on file. Not reviewed this visit You Were Diagnosed With   
  
 Codes Comments Lyme disease    -  Primary ICD-10-CM: A69.20 ICD-9-CM: 088.81 Vitals BP Pulse Temp Resp Height(growth percentile) Weight(growth percentile)  
 115/65 (BP 1 Location: Right arm, BP Patient Position: Sitting) 73 97.8 °F (36.6 °C) (Oral) 17 5' 3.5\" (1.613 m) 131 lb 6.4 oz (59.6 kg) SpO2 BMI OB Status Smoking Status 98% 22.91 kg/m2 Hysterectomy Never Smoker Vitals History BMI and BSA Data  Body Mass Index Body Surface Area  
 22.91 kg/m 2 1.63 m 2  
  
  
 Preferred Pharmacy Pharmacy Name Phone [facility]/PHARMACY #[id number] [place] [phone] Your Updated Medication List  
  
   
This list is accurate as of 5/29/18  2:13 PM.  Always use your most recent med list.  
  
  
  
  
 alendronate 70 mg tablet Commonly known as:  FOSAMAX Take 1 Tab by mouth every seven (7) days. Indications: POST-MENOPAUSAL OSTEOPOROSIS  
  
 citalopram 20 mg tablet Commonly known as:  [place] Take 1 Tab by mouth daily. Indications: ANXIETY WITH DEPRESSION, New higher dose  
  
 doxycycline 100 mg tablet Commonly known as:  ADOXA Take 1 Tab by mouth two (2) times a day for 21 days. estradiol 0.01 % (0.1 mg/gram) vaginal cream  
Commonly known as:  ESTRACE Apply to affected area every day for the first 2 weeks, then 2 times per week after that  
  
 valACYclovir 500 mg tablet Commonly known as:  VALTREX Take 1 Tab by mouth two (2) times daily as needed. Prescriptions Sent to Pharmacy Refills  
 doxycycline (ADOXA) 100 mg tablet 0 Sig: Take 1 Tab by mouth two (2) times a day for 21 days. Class: Normal  
 Pharmacy: [facility]/pharmacy #[id number] [address] [place] Ph #: [phone] Route: Oral  
  
Follow-up Instructions Return if symptoms worsen or fail to improve. Patient Instructions If you have any questions regarding [employer], you may call [employer] support at [phone]. [facility] SERVICES! Dear Faith Fillers: Thank you for requesting a [employer] account. Our records indicate that you already have an active [employer] account. You can access your account anytime at [email or web address] Did you know that you can access your hospital and ER discharge instructions at any time in [employer]? You can also review all of your test results from your hospital stay or ER visit. Additional Information If you have questions, please visit the Frequently Asked Questions section of the [email or web address] website at [email or web address] Remember, [employer] is NOT to be used for urgent needs. For medical emergencies, dial 911. Now available from your iPhone and Android! Please provide this summary of care documentation to your next provider. Your primary care clinician is listed as Paradise Bojorquez. If you have any questions after today's visit, please call [phone].

## 2018-05-29 NOTE — PROGRESS NOTES
Chief Complaint   Patient presents with    Insect Bite     tick bite         HPI:       is a 70 y.o. female. New Issues:  She was bitten by a tick. Has a bullseye rash. Has had Lyme's before. Allergies   Allergen Reactions    Codeine Other (comments)     Flu like symptoms       Current Outpatient Prescriptions   Medication Sig    citalopram (CELEXA) 20 mg tablet Take 1 Tab by mouth daily. Indications: ANXIETY WITH DEPRESSION, New higher dose    estradiol (ESTRACE) 0.01 % (0.1 mg/gram) vaginal cream Apply to affected area every day for the first 2 weeks, then 2 times per week after that    alendronate (FOSAMAX) 70 mg tablet Take 1 Tab by mouth every seven (7) days. Indications: POST-MENOPAUSAL OSTEOPOROSIS    valACYclovir (VALTREX) 500 mg tablet Take 1 Tab by mouth two (2) times daily as needed. No current facility-administered medications for this visit. Past Medical History:   Diagnosis Date    Menopause        Past Surgical History:   Procedure Laterality Date    HX KNEE REPLACEMENT Left 05/05/2009       Social History     Social History    Marital status: SINGLE     Spouse name: N/A    Number of children: N/A    Years of education: N/A     Social History Main Topics    Smoking status: Never Smoker    Smokeless tobacco: Never Used    Alcohol use No    Drug use: No    Sexual activity: Yes     Partners: Male     Other Topics Concern     Service No    Blood Transfusions No    Caffeine Concern No    Occupational Exposure No    Hobby Hazards No    Sleep Concern No    Stress Concern No    Weight Concern No    Special Diet No    Back Care Yes    Exercise Yes    Bike Helmet Yes    Seat Belt Yes    Self-Exams Yes     Social History Narrative       Family History   Problem Relation Age of Onset    Heart Attack Father     Cancer Sister        Above history reviewed.       ROS:  Denies fever, chills, cough, chest pain, SOB,  nausea, vomiting, or diarrhea. Denies wt loss, wt gain, hemoptysis, hematochezia or melena. Physical Examination:    /65 (BP 1 Location: Right arm, BP Patient Position: Sitting)  Pulse 73  Temp 97.8 °F (36.6 °C) (Oral)   Resp 17  Ht 5' 3.5\" (1.613 m)  Wt 131 lb 6.4 oz (59.6 kg)  SpO2 98%  BMI 22.91 kg/m2    General: Alert and Ox3, Fluent speech  Neck:  Supple, no adenopathy, JVD, mass or bruit  Chest:  Clear to Ausculation, without wheezes, rales, rubs or ronchi  Cardiac: RRR  Extremities:  No cyanosis, clubbing or edema  Neurologic:  Ambulatory without assist, CN 2-12 grossly intact. Moves all extremities. Skin: Right upper abdomen with insect bite with 8 cm surrounding rash. Lymphadenopathy: no cervical or supraclavicular nodes    ASSESSMENT AND PLAN:     1. Lyme disease  Treating based on CDC guidelines. Avoid direct sunlight.    - doxycycline (ADOXA) 100 mg tablet; Take 1 Tab by mouth two (2) times a day for 21 days. Dispense: 42 Tab;  Refill: 0     RTC PRN    Tasneem Willis NP

## 2018-05-29 NOTE — TELEPHONE ENCOUNTER
Gemma Roa called. She has a tick bite and has had  lyme disease before. She is experiencing some of the same symptoms. Needs to see Mathew Loo today please.

## 2018-06-14 ENCOUNTER — TELEPHONE (OUTPATIENT)
Dept: FAMILY MEDICINE CLINIC | Age: 63
End: 2018-06-14

## 2018-06-14 NOTE — TELEPHONE ENCOUNTER
Patient not feeling great on Doxycycline. Having stomach upset. Educated patient to take with food. Almost done with treatment.

## 2018-06-14 NOTE — TELEPHONE ENCOUNTER
[phone] contact # ruthy Beth, would like for Matilde to call concerning lime treatment & feeling poorly.      Thanks,

## 2018-06-25 ENCOUNTER — TELEPHONE (OUTPATIENT)
Dept: FAMILY MEDICINE CLINIC | Age: 63
End: 2018-06-25

## 2018-06-25 RX ORDER — MECLIZINE HYDROCHLORIDE 25 MG/1
TABLET ORAL
Qty: 30 TAB | Refills: 2 | Status: SHIPPED | COMMUNITY
Start: 2018-06-25 | End: 2019-08-22 | Stop reason: SDUPTHER

## 2018-06-25 RX ORDER — MECLIZINE HYDROCHLORIDE 25 MG/1
TABLET ORAL
Refills: 2 | COMMUNITY
Start: 2018-06-21 | End: 2018-06-25 | Stop reason: SDUPTHER

## 2018-06-25 NOTE — TELEPHONE ENCOUNTER
----- Message from Aria Stewart sent at 6/25/2018 11:09 AM EDT -----  Regarding: SOLIS Wolf/Telephone  Pt request for a a refill of her Rx \"Meclizine\" 25mg to be sent to her [facility] at [phone] . Best contact number is [phone].

## 2018-07-10 ENCOUNTER — TELEPHONE (OUTPATIENT)
Dept: FAMILY MEDICINE CLINIC | Age: 63
End: 2018-07-10

## 2018-07-10 NOTE — TELEPHONE ENCOUNTER
----- Message from Nadeem Zazueta sent at 7/9/2018  4:46 PM EDT -----  Regarding: SOLIS Wolf/Telephone  Patient needs a recommendation to a Vascular Surgeon. Her number is [phone].

## 2018-07-11 ENCOUNTER — TELEPHONE (OUTPATIENT)
Dept: FAMILY MEDICINE CLINIC | Age: 63
End: 2018-07-11

## 2018-07-11 DIAGNOSIS — I83.819 VARICOSE VEINS OF UNSPECIFIED LOWER EXTREMITY WITH PAIN: Primary | ICD-10-CM

## 2018-07-11 NOTE — TELEPHONE ENCOUNTER
Spoke to patient she needs a referral to see Dr. Brigido Scheuermann for varicose veins and leg discomfort from a previous injury.

## 2018-08-23 ENCOUNTER — LAB ONLY (OUTPATIENT)
Dept: FAMILY MEDICINE CLINIC | Age: 63
End: 2018-08-23
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
## 2018-08-23 NOTE — MR AVS SNAPSHOT
[address] [facility] 
 
 
 [address] [facility] [address] [facility] [address] [phone] Patient: Ron Spence MRN: [id number] Visit Information Date & Time Provider Department Dept. Phone Encounter #  
 8/23/2018 10:15 AM LEOBARDO SEMAJ NURSE [id number] Yasmani Queen [id number] Your Appointments 9/27/2018  1:00 PM  
ESTABLISHED PATIENT with SOLIS Moyer 38 [facility] Appt Note: 6mo fu  
 [address] [facility] [address] [facility] [address] [phone]  
  
   
 [address] [facility] [address] Upcoming Health Maintenance Date Due DTaP/Tdap/Td series (1 - Tdap) 2/12/1974 FOBT Q 1 YEAR AGE 50-75 2/12/2003 ZOSTER VACCINE AGE 60> 12/12/2012 GLAUCOMA SCREENING Q2Y 2/12/2018 Pneumococcal 65+ Low/Medium Risk (1 of 2 - PCV13) 2/12/2018 Influenza Age 5 to Adult 8/1/2018 MEDICARE YEARLY EXAM 3/28/2019 BREAST CANCER SCRN MAMMOGRAM 3/23/2020 Allergies as of 8/23/2018  Review Complete On: 5/29/2018 By: Paulette Jackson NP Severity Noted Reaction Type Reactions Codeine  03/13/2018    Other (comments) Flu like symptoms Current Immunizations  Never Reviewed No immunizations on file. Not reviewed this visit You Were Diagnosed With   
  
 Codes Comments Tick bite, initial encounter    -  Primary ICD-10-CM: W57. Inna Call ICD-9-CM: 919.4, E906.4 Vitals OB Status Smoking Status Hysterectomy Never Smoker Preferred Pharmacy Pharmacy Name Phone [id number] [address] [phone] Your Updated Medication List  
  
   
This list is accurate as of 8/23/18 11:03 AM.  Always use your most recent med list.  
  
  
  
  
 alendronate 70 mg tablet Commonly known as:  FOSAMAX Take 1 Tab by mouth every seven (7) days.  Indications: POST-MENOPAUSAL OSTEOPOROSIS  
  
 citalopram 20 mg tablet Commonly known as:  Delona Nestle Take 1 Tab by mouth daily. Indications: ANXIETY WITH DEPRESSION, New higher dose  
  
 estradiol 0.01 % (0.1 mg/gram) vaginal cream  
Commonly known as:  ESTRACE Apply to affected area every day for the first 2 weeks, then 2 times per week after that  
  
 meclizine 25 mg tablet Commonly known as:  ANTIVERT  
TAKE 1 TABLET BY MOUTH THREE (3) TIMES DAILY AS NEEDED FOR UP TO 10 DAYS. INDICATIONS: VERTIGO  
  
 valACYclovir 500 mg tablet Commonly known as:  VALTREX Take 1 Tab by mouth two (2) times daily as needed. We Performed the Following LYME AB, IGM, WITH REFLEX WBLOT [[id number] Custom] Patient Instructions If you have any questions regarding [employer], you may call [employer] support at [phone]. Introducing [facility] SERVICES! Dear Michoacano Kramer: Thank you for requesting a [employer] account. Our records indicate that you already have an active [employer] account. You can access your account anytime at [email or web address] Did you know that you can access your hospital and ER discharge instructions at any time in [employer]? You can also review all of your test results from your hospital stay or ER visit. Additional Information If you have questions, please visit the Frequently Asked Questions section of the [employer] website at [email or web address] Remember, [employer] is NOT to be used for urgent needs. For medical emergencies, dial 911. Now available from your iPhone and Android! Please provide this summary of care documentation to your next provider. Lyme Disease Testing Disclaimer:   
 § 28.2-3280.5. (Expires July 1, 2018) Lyme disease testing information disclosure.   
A. Every licensee or his in-office designee who orders a laboratory test for the presence of Lyme disease shall provide to the patient or his legal representative the following written information: \"ACCORDING TO THE CENTERS FOR DISEASE CONTROL AND PREVENTION, AS OF 2011 LYME DISEASE IS THE SIXTH FASTEST GROWING DISEASE IN THE [place]. YOUR HEALTH CARE PROVIDER HAS ORDERED A LABORATORY TEST FOR THE PRESENCE OF LYME DISEASE FOR YOU. CURRENT LABORATORY TESTING FOR LYME DISEASE CAN BE PROBLEMATIC AND STANDARD LABORATORY TESTS OFTEN RESULT IN FALSE NEGATIVE AND FALSE POSITIVE RESULTS, AND IF DONE TOO EARLY, YOU MAY NOT HAVE PRODUCED ENOUGH ANTIBODIES TO BE CONSIDERED POSITIVE BECAUSE YOUR IMMUNE RESPONSE REQUIRES TIME TO DEVELOP ANTIBODIES. IF YOU ARE TESTED FOR LYME DISEASE, AND THE RESULTS ARE NEGATIVE, THIS DOES NOT NECESSARILY MEAN YOU DO NOT HAVE LYME DISEASE. IF YOU CONTINUE TO EXPERIENCE SYMPTOMS, YOU SHOULD CONTACT YOUR HEALTH CARE PROVIDER AND INQUIRE ABOUT THE APPROPRIATENESS OF RETESTING OR ADDITIONAL TREATMENT. \"  
B. Licensees shall be immune from civil liability for the provision of the written information required by this section absent gross negligence or willful misconduct. Your primary care clinician is listed as Sean Salinas. If you have any questions after today's visit, please call [phone].

## 2018-08-29 ENCOUNTER — TELEPHONE (OUTPATIENT)
Dept: FAMILY MEDICINE CLINIC | Age: 63
End: 2018-08-29

## 2018-08-29 LAB
B BURGDOR IGG PATRN SER IB-IMP: POSITIVE
B BURGDOR IGM PATRN SER IB-IMP: NEGATIVE
B BURGDOR IGM SER IA-ACNC: 1.21 INDEX (ref 0–0.79)
B BURGDOR18KD IGG SER QL IB: PRESENT
B BURGDOR23KD IGG SER QL IB: PRESENT
B BURGDOR23KD IGM SER QL IB: PRESENT
B BURGDOR28KD IGG SER QL IB: PRESENT
B BURGDOR30KD IGG SER QL IB: PRESENT
B BURGDOR39KD IGG SER QL IB: PRESENT
B BURGDOR39KD IGM SER QL IB: ABNORMAL
B BURGDOR41KD IGG SER QL IB: PRESENT
B BURGDOR41KD IGM SER QL IB: ABNORMAL
B BURGDOR45KD IGG SER QL IB: PRESENT
B BURGDOR58KD IGG SER QL IB: PRESENT
B BURGDOR66KD IGG SER QL IB: PRESENT
B BURGDOR93KD IGG SER QL IB: PRESENT

## 2018-08-29 NOTE — PROGRESS NOTES
Blood tests show previous Lyme's disease a few months ago. I'm glad you were treated at that time. This is indicated by the IGG results. Your body has made antibodies and is fighting appropriately. IGM is almost back in normal range. No additional treatment is needed at this time unless you have swollen, red joints. We should recheck this test in 2 months to check trend.

## 2018-08-29 NOTE — TELEPHONE ENCOUNTER
Patient is aware of lab results. She states she does still have fatigue and she has swelling from time to time. She said when she has the swelling it's very severe. Patient is not interested in taking anymore antibiotics. She said if you have any other suggestions that would be great.

## 2018-10-01 PROBLEM — Z86.19 HX OF LYME DISEASE: Status: ACTIVE | Noted: 2018-10-01

## 2020-10-21 RX ORDER — MECLIZINE HYDROCHLORIDE 25 MG/1
TABLET ORAL
Qty: 30 TAB | Refills: 2 | Status: SHIPPED | OUTPATIENT
Start: 2020-10-21

## 2021-07-11 ENCOUNTER — HOSPITAL ENCOUNTER (EMERGENCY)
Age: 66
Discharge: HOME | End: 2021-07-11
Attending: EMERGENCY MEDICINE
Payer: MEDICARE

## 2021-07-11 VITALS
WEIGHT: 132 LBS | DIASTOLIC BLOOD PRESSURE: 68 MMHG | OXYGEN SATURATION: 100 % | HEIGHT: 64 IN | BODY MASS INDEX: 22.53 KG/M2 | HEART RATE: 77 BPM | TEMPERATURE: 97.4 F | RESPIRATION RATE: 16 BRPM | SYSTOLIC BLOOD PRESSURE: 124 MMHG
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
## 2021-07-11 DIAGNOSIS — T07.XXXA MULTIPLE PUNCTURE WOUNDS: ICD-10-CM

## 2021-07-11 DIAGNOSIS — T07.XXXA MULTIPLE LACERATIONS: Primary | ICD-10-CM

## 2021-07-11 PROCEDURE — 75810000293 HC SIMP/SUPERF WND  RPR

## 2021-07-11 PROCEDURE — 74011000250 HC RX REV CODE- 250: Performed by: EMERGENCY MEDICINE

## 2021-07-11 PROCEDURE — 74011250637 HC RX REV CODE- 250/637: Performed by: EMERGENCY MEDICINE

## 2021-07-11 PROCEDURE — 90471 IMMUNIZATION ADMIN: CPT

## 2021-07-11 PROCEDURE — 77030002916 HC SUT ETHLN J&J -A

## 2021-07-11 PROCEDURE — 90715 TDAP VACCINE 7 YRS/> IM: CPT | Performed by: EMERGENCY MEDICINE

## 2021-07-11 PROCEDURE — 74011250636 HC RX REV CODE- 250/636: Performed by: EMERGENCY MEDICINE

## 2021-07-11 PROCEDURE — 99283 EMERGENCY DEPT VISIT LOW MDM: CPT

## 2021-07-11 PROCEDURE — 77030018842 HC SOL IRR SOD CL 9% BAXT -A

## 2021-07-11 RX ORDER — AMOXICILLIN AND CLAVULANATE POTASSIUM 875; 125 MG/1; MG/1
1 TABLET, FILM COATED ORAL 2 TIMES DAILY
Qty: 14 TABLET | Refills: 0 | Status: SHIPPED | OUTPATIENT
Start: 2021-07-11 | End: 2021-08-27

## 2021-07-11 RX ORDER — AMOXICILLIN AND CLAVULANATE POTASSIUM 875; 125 MG/1; MG/1
1 TABLET, FILM COATED ORAL
Status: COMPLETED | OUTPATIENT
Start: 2021-07-11 | End: 2021-07-11

## 2021-07-11 RX ORDER — NAPROXEN 500 MG/1
500 TABLET ORAL 2 TIMES DAILY WITH MEALS
Qty: 20 TABLET | Refills: 0 | Status: SHIPPED | OUTPATIENT
Start: 2021-07-11 | End: 2021-07-21

## 2021-07-11 RX ORDER — LIDOCAINE HYDROCHLORIDE AND EPINEPHRINE 10; 10 MG/ML; UG/ML
1.5 INJECTION, SOLUTION INFILTRATION; PERINEURAL ONCE
Status: COMPLETED | OUTPATIENT
Start: 2021-07-11 | End: 2021-07-11

## 2021-07-11 RX ADMIN — LIDOCAINE HYDROCHLORIDE AND EPINEPHRINE 15 MG: 10; 10 INJECTION, SOLUTION INFILTRATION; PERINEURAL at 08:23

## 2021-07-11 RX ADMIN — TETANUS TOXOID, REDUCED DIPHTHERIA TOXOID AND ACELLULAR PERTUSSIS VACCINE, ADSORBED 0.5 ML: 5; 2.5; 8; 8; 2.5 SUSPENSION INTRAMUSCULAR at 08:23

## 2021-07-11 RX ADMIN — AMOXICILLIN AND CLAVULANATE POTASSIUM 1 TABLET: 875; 125 TABLET, FILM COATED ORAL at 08:43

## 2021-07-11 NOTE — ED NOTES
Left forearm dressed with Telfa and Kerlix; smaller wounds to left hand dressed with Bandaids. Right hand and forearm dressed with Telfa and Luis. Wounds to left leg dressed with Bandaids.

## 2021-07-11 NOTE — ED NOTES
Wounds cleaned and irrigated with sterile normal saline. Multiple wounds repaired by Dr. Madison Pacheco for a total of 18 interrupted sutures.

## 2021-07-11 NOTE — ED PROVIDER NOTES
[address] [facility]  EMERGENCY DEPARTMENT HISTORY AND PHYSICAL EXAM         Date of Service: 7/11/2021   Patient Name: Nahomy Estrada   YOB: 1955  Medical Record Number: [id number]    History of Presenting Illness     Chief Complaint   Patient presents with    Dog Bite        History Provided By:  patient    Additional History:   Nahomy Estrada is a 74 y.o. female who presents ambulatory to the ED with cc of multiple lacerations on the right hand, left forearm, left hand, and left leg after she tried to break up a fight with 2 large dogs and was bitten in multiple places. Per pt, both dogs are known to be up to date on rabies vaccination. Pt has at least 10-15 lacerations and puncture wounds; lacerations  total 18 cm. Bleeding controlled. Unknown last Td. There are no other complaints, changes or physical findings at this time. Primary Care Provider: Harjinder Andrew NP   Specialist:    Past History     Past Medical History:   Past Medical History:   Diagnosis Date    Menopause         Past Surgical History:   Past Surgical History:   Procedure Laterality Date    HX KNEE REPLACEMENT Left 05/05/2009        Family History:   Family History   Problem Relation Age of Onset    Heart Attack Father     Cancer Sister         Social History:   Social History     Tobacco Use    Smoking status: Never Smoker    Smokeless tobacco: Never Used   Substance Use Topics    Alcohol use: No    Drug use: No        Allergies: Allergies   Allergen Reactions    Codeine Other (comments) and Nausea and Vomiting     Flu like symptoms  Flu like symptoms        Review of Systems   Review of Systems   Constitutional: Negative for appetite change, chills and fever. HENT: Negative for congestion. Eyes: Negative for visual disturbance. Respiratory: Negative for cough, shortness of breath and wheezing. Cardiovascular: Negative for chest pain, palpitations and leg swelling. Gastrointestinal: Negative for abdominal pain. Genitourinary: Negative for dysuria, frequency and urgency. Musculoskeletal: Negative for back pain, joint swelling, myalgias and neck stiffness. Skin: Positive for wound. Negative for rash. Neurological: Negative for dizziness, syncope, weakness and headaches. Hematological: Negative for adenopathy. Psychiatric/Behavioral: Negative for behavioral problems and dysphoric mood. Physical Exam  Physical Exam  Vitals and nursing note reviewed. Constitutional:       General: She is not in acute distress. Appearance: She is well-developed. HENT:      Head: Normocephalic and atraumatic. Eyes:      General: No scleral icterus. Conjunctiva/sclera: Conjunctivae normal.      Pupils: Pupils are equal, round, and reactive to light. Cardiovascular:      Rate and Rhythm: Normal rate and regular rhythm. Heart sounds: No murmur heard. No gallop. Pulmonary:      Effort: Pulmonary effort is normal. No respiratory distress. Breath sounds: No stridor. No wheezing or rales. Abdominal:      General: Bowel sounds are normal. There is no distension. Palpations: Abdomen is soft. There is no mass. Tenderness: There is no abdominal tenderness. There is no guarding or rebound. Musculoskeletal:         General: Normal range of motion. Cervical back: Normal range of motion and neck supple. Lymphadenopathy:      Cervical: No cervical adenopathy. Skin:     General: Skin is warm and dry. Findings: No erythema or rash. Comments: Multiple lacerations and puncture wounds on BUE and left leg, total 18 cm lac length, largest is 5 cm on posterior left forearm deep to fascia. NVI in all distal extremities including wrists, fingers, and lower left leg. Neurological:      Mental Status: She is alert and oriented to person, place, and time. Cranial Nerves: No cranial nerve deficit.       Coordination: Coordination normal. Medical Decision Making   I am the first provider for this patient. I reviewed the vital signs, available nursing notes, past medical history, past surgical history, family history and social history. Old Medical Records: PCP notes      ED Course:  8:40 AM   Initial assessment performed. The patients presenting problems have been discussed, and they are in agreement with the care plan formulated and outlined with them. I have encouraged them to ask questions as they arise throughout their visit. Progress Notes:  8:47 AM  Lacerations cleaned, thoroughly irrigated with NS, loosely closed with total 18 sutures. Puncture wounds irrigated and cleaned/dressed. Td updated, first dose Augmentin given. Wound check at PCP 2-3 days, sutures out 1 week. Clinton Mcneill MD      Procedures:   Wound Repair    Date/Time: 7/11/2021 8:45 AM  Performed by: attendingPreparation: skin prepped with Betadine and sterile field established  Location details: right hand  left hand  left arm and left leg  Wound length:12.6 - 20 cm  Anesthesia: local infiltration    Anesthesia:  Local Anesthetic: lidocaine 1% with epinephrine  Foreign bodies: no foreign bodies  Irrigation solution: saline  Irrigation method: syringe and jet lavage  Debridement: minimal  Skin closure: 4-0 nylon  Number of sutures: 18  Technique: simple and interrupted  Approximation: loose  Dressing: antibiotic ointment and gauze roll  My total time at bedside, performing this procedure was 31-45 minutes. Diagnostic Study Results   Labs -    No results found for this or any previous visit (from the past 12 hour(s)). Radiologic Studies -  The following have been ordered and reviewed:  No orders to display     CT Results  (Last 48 hours)    None        CXR Results  (Last 48 hours)    None            Vital Signs-Reviewed the patient's vital signs.    Patient Vitals for the past 12 hrs:   Temp Pulse Resp BP SpO2   07/11/21 0748 97.4 °F (36.3 °C) 77 16 124/68 100 %       Medications Given in the ED:  Medications   diph,Pertuss(AC),Tet Vac-PF (BOOSTRIX) suspension 0.5 mL (0.5 mL IntraMUSCular Given 7/11/21 0823)   lidocaine-EPINEPHrine (XYLOCAINE) 1 %-1:100,000 injection 15 mg (15 mg SubCUTAneous Given by Provider 7/11/21 0823)   amoxicillin-clavulanate (AUGMENTIN) 875-125 mg per tablet 1 Tablet (1 Tablet Oral Given 7/11/21 0843)       Diagnosis:  Clinical Impression:   1. Multiple lacerations    2. Dog bite, initial encounter    3. Multiple puncture wounds         Plan:  1:   Follow-up Information     Follow up With Specialties Details Why Contact Info    Moses Gomes NP Nurse Practitioner In 3 days For wound re-check [address] [place] [address]  [phone]      Moses Gomes NP Nurse Practitioner In 1 week For suture removal [address] [place] [address]  [phone]            2:   Current Discharge Medication List      START taking these medications    Details   amoxicillin-clavulanate (Augmentin) 875-125 mg per tablet Take 1 Tablet by mouth two (2) times a day. Qty: 14 Tablet, Refills: 0  Start date: 7/11/2021      naproxen (Naprosyn) 500 mg tablet Take 1 Tablet by mouth two (2) times daily (with meals) for 10 days. Qty: 20 Tablet, Refills: 0  Start date: 7/11/2021, End date: 7/21/2021           Return to ED if worse. Disposition:  Home  _______________________________   Attestations: This note was performed by Yaima Yeboah MD in its entirety.   _______________________________

## 2021-07-11 NOTE — ED TRIAGE NOTES
Dog bite to left arm,right arm and left leg prior to arrival, bleeding controlled . She reports dog is up to date on vaccines.  Pt needs tdap

## 2021-07-12 ENCOUNTER — OFFICE VISIT (OUTPATIENT)
Dept: FAMILY MEDICINE CLINIC | Age: 66
End: 2021-07-12
Payer: MEDICARE

## 2021-07-12 VITALS
HEIGHT: 64 IN | DIASTOLIC BLOOD PRESSURE: 56 MMHG | OXYGEN SATURATION: 97 % | SYSTOLIC BLOOD PRESSURE: 108 MMHG | HEART RATE: 98 BPM | TEMPERATURE: 98.4 F | WEIGHT: 132.4 LBS | RESPIRATION RATE: 16 BRPM | BODY MASS INDEX: 22.61 KG/M2

## 2021-07-12 DIAGNOSIS — S80.212A ABRASION OF KNEE, BILATERAL: ICD-10-CM
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
## 2021-07-12 DIAGNOSIS — S80.211A ABRASION OF KNEE, BILATERAL: ICD-10-CM

## 2021-07-12 PROCEDURE — 99214 OFFICE O/P EST MOD 30 MIN: CPT | Performed by: NURSE PRACTITIONER

## 2021-07-12 PROCEDURE — G8536 NO DOC ELDER MAL SCRN: HCPCS | Performed by: NURSE PRACTITIONER

## 2021-07-12 PROCEDURE — G9899 SCRN MAM PERF RSLTS DOC: HCPCS | Performed by: NURSE PRACTITIONER

## 2021-07-12 PROCEDURE — G8420 CALC BMI NORM PARAMETERS: HCPCS | Performed by: NURSE PRACTITIONER

## 2021-07-12 PROCEDURE — G8427 DOCREV CUR MEDS BY ELIG CLIN: HCPCS | Performed by: NURSE PRACTITIONER

## 2021-07-12 PROCEDURE — 3017F COLORECTAL CA SCREEN DOC REV: CPT | Performed by: NURSE PRACTITIONER

## 2021-07-12 PROCEDURE — G9717 DOC PT DX DEP/BP F/U NT REQ: HCPCS | Performed by: NURSE PRACTITIONER

## 2021-07-12 PROCEDURE — 1090F PRES/ABSN URINE INCON ASSESS: CPT | Performed by: NURSE PRACTITIONER

## 2021-07-12 PROCEDURE — 1101F PT FALLS ASSESS-DOCD LE1/YR: CPT | Performed by: NURSE PRACTITIONER

## 2021-07-12 NOTE — PROGRESS NOTES
Chief Complaint   Patient presents with    Dog Bite     Dog bite yesterday, ER visit, 18-20 stitches, puncture wounds on knee         HPI:       is a 74 y.o. female. Moved here from [place].  Originally from [place]. 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     Anxiety/Depression: Has had it her all life.  She did some binge eating in the past to cope.  Also previous ETOH issues.  Got a DUI. Freida Fleischer has been seeing LYDIA Chang. She completed rehab at the [facility] in Spring 2019. Previously on Naltrexone. Currently on Celexa.       She was treated for Lyme's Disease previously and now follows with Dr. Kamala Lopez at [facility]. New Issues:  She was dog sitting on Sunday (7/11/21) and walking the dog, Ashok [place], on her usual route. Another dog, Buddy, came up from behind her and attacked them. uJlian Nieves got on top of Ashok Wong to try to protect him. She yelled for her boyfriend, Esther Chaney, for help and he came and after considerable effort was able to get Buddy off of them. Julian Nieves sustained wounds to both knees, arms, arms and the back of her head/neck. She took a shower to get some of the gravel out of her knees and was taken to the [facility]"" ER. In the ER, wounds were repaired with approximately 18 sutures. She was given a tetanus shot and was started on Augmentin. Per Julian Nieves, they are very familiar with Buddy and believe he is UTD on all vaccines. Allergies   Allergen Reactions    Codeine Other (comments) and Nausea and Vomiting     Flu like symptoms  Flu like symptoms       Current Outpatient Medications   Medication Sig    amoxicillin-clavulanate (Augmentin) 875-125 mg per tablet Take 1 Tablet by mouth two (2) times a day.  naproxen (Naprosyn) 500 mg tablet Take 1 Tablet by mouth two (2) times daily (with meals) for 10 days.  valACYclovir (Valtrex) 500 mg tablet Take 1 Tab by mouth two (2) times daily as needed (Cold sores).     meclizine (ANTIVERT) 25 mg tablet TAKE 1 TABLET BY MOUTH THREE TIMES DAILY AS NEEDED FOR VERTIGO FOR UP TO 10 DAYS (Patient not taking: Reported on 7/12/2021)    citalopram (CELEXA) 20 mg tablet TAKE 1 TABLET BY MOUTH EVERY DAY    ondansetron (ZOFRAN ODT) 4 mg disintegrating tablet Take 1 Tab by mouth every eight (8) hours as needed for Nausea. (Patient not taking: Reported on 7/12/2021)    estradiol (ESTRACE) 0.01 % (0.1 mg/gram) vaginal cream Apply to affected area every day for the first 2 weeks, then 2 times per week after that (Patient not taking: Reported on 7/12/2021)     No current facility-administered medications for this visit. Past Medical History:   Diagnosis Date    Menopause        Past Surgical History:   Procedure Laterality Date    HX KNEE REPLACEMENT Left 05/05/2009       Social History     Socioeconomic History    Marital status: SINGLE     Spouse name: Not on file    Number of children: Not on file    Years of education: Not on file    Highest education level: Not on file   Tobacco Use    Smoking status: Never Smoker    Smokeless tobacco: Never Used   Substance and Sexual Activity    Alcohol use: No    Drug use: No    Sexual activity: Yes     Partners: Male   Other Topics Concern     Service No    Blood Transfusions No    Caffeine Concern No    Occupational Exposure No    Hobby Hazards No    Sleep Concern No    Stress Concern No    Weight Concern No    Special Diet No    Back Care Yes    Exercise Yes    Bike Helmet Yes    Seat Belt Yes    Self-Exams Yes     Social Determinants of Health     Financial Resource Strain:     Difficulty of Paying Living Expenses:    Food Insecurity:     Worried About Running Out of Food in the Last Year:     Ran Out of Food in the Last Year:    Transportation Needs:     Lack of Transportation (Medical):      Lack of Transportation (Non-Medical):    Physical Activity:     Days of Exercise per Week:     Minutes of Exercise per Session:    Stress:     Feeling of Stress :    Social Connections:     Frequency of Communication with Friends and Family:     Frequency of Social Gatherings with Friends and Family:     Attends Sabianist Services:     Active Member of Clubs or Organizations:     Attends Club or Organization Meetings:     Marital Status:        Family History   Problem Relation Age of Onset    Heart Attack Father     Cancer Sister        Above history reviewed. ROS:  Denies fever, chills, cough, chest pain, SOB,  nausea, vomiting, or diarrhea. Denies wt loss, wt gain, hemoptysis, hematochezia or melena. Physical Examination:    BP (!) 108/56 (BP 1 Location: Right arm, BP Patient Position: Sitting, BP Cuff Size: Adult)   Pulse 98   Temp 98.4 °F (36.9 °C)   Resp 16   Ht 5' 4\" (1.626 m)   Wt 132 lb 6.4 oz (60.1 kg)   SpO2 97%   BMI 22.73 kg/m²     General: Alert and Ox3, Fluent speech  Neck:  Supple, no adenopathy, JVD, mass or bruit  Chest:  Clear to Ausculation, without wheezes, rales, rubs or ronchi  Cardiac: RRR  Extremities:  No cyanosis, clubbing or edema  Neurologic:  Ambulatory without assist, CN 2-12 grossly intact. Moves all extremities. Skin: Left forearm, left hand, right wrist, and right hand with multiple lacerations and puncture wounds with intact sutures. Left hand with erythema and soft tissue swelling. Bilateral knees with puncture wounds and abrasions. Wounds that were repaired by sutures are well approximated and have minimal surrounding erythema. Lymphadenopathy: no cervical or supraclavicular nodes    ASSESSMENT AND PLAN:     1. Dog bite of multiple sites  Continue Augmentin as this is the first line for animal bites  Tetanus UTD  Simple wound care daily with Vaseline gauze, non-adhearant pads and roll gauze   Patient will need close f/up to monitor for infection     2.  Abrasion of knee, bilateral  Simple wound care  Keep clean and dry and apply Vaseline as needed     RTC Friday or sooner for any ALARM sx such as temp > 101.5, worsening redness or drainage from wounds, worsening cough, sputum production, confusion or shortness of breath.        Rambo Winter, NP

## 2021-07-12 NOTE — PROGRESS NOTES
Identified pt with two pt identifiers(name and ). Reviewed record in preparation for visit and have obtained necessary documentation. Chief Complaint   Patient presents with    Dog Bite     Dog bite yesterday, ER visit, 18-20 stitches, puncture wounds on knee      Vitals:    21 1410   BP: (!) 108/56   Pulse: 98   Resp: 16   Temp: 98.4 °F (36.9 °C)   SpO2: 97%   Weight: 132 lb 6.4 oz (60.1 kg)   Height: 5' 4\" (1.626 m)   PainSc:   6     Health Maintenance Due   Topic    COVID-19 Vaccine (1)    Shingrix Vaccine Age 50> (1 of 2)     Health Maintenance Review: Patient reminded of \"due or due soon\" health maintenance. I have asked the patient to contact his/her primary care provider (PCP) for follow-up on his/her health maintenance. Coordination of Care Questionnaire:  :   1) Have you been to an emergency room, urgent care, or hospitalized since your last visit? If yes, where when, and reason for visit? yes yesterday for dog bites      2. Have seen or consulted any other health care provider since your last visit? If yes, where when, and reason for visit? NO      3) Do you have an Advanced Directive/ Living Will in place? NO  If yes, do we have a copy on file NO  If no, would you like information NO    Patient is accompanied by partner I have received verbal consent from Rui Baird to discuss any/all medical information while they are present in the room.

## 2021-07-16 ENCOUNTER — OFFICE VISIT (OUTPATIENT)
Dept: FAMILY MEDICINE CLINIC | Age: 66
End: 2021-07-16
Payer: MEDICARE

## 2021-07-16 VITALS
HEIGHT: 64 IN | HEART RATE: 86 BPM | DIASTOLIC BLOOD PRESSURE: 60 MMHG | RESPIRATION RATE: 22 BRPM | SYSTOLIC BLOOD PRESSURE: 98 MMHG | OXYGEN SATURATION: 98 % | BODY MASS INDEX: 22.88 KG/M2 | TEMPERATURE: 98.6 F | WEIGHT: 134 LBS

## 2021-07-16 DIAGNOSIS — S80.211A ABRASION OF KNEE, BILATERAL: ICD-10-CM

## 2021-07-16 DIAGNOSIS — S80.212A ABRASION OF KNEE, BILATERAL: ICD-10-CM
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
## 2021-07-16 PROCEDURE — 1101F PT FALLS ASSESS-DOCD LE1/YR: CPT | Performed by: NURSE PRACTITIONER

## 2021-07-16 PROCEDURE — G9717 DOC PT DX DEP/BP F/U NT REQ: HCPCS | Performed by: NURSE PRACTITIONER

## 2021-07-16 PROCEDURE — G9899 SCRN MAM PERF RSLTS DOC: HCPCS | Performed by: NURSE PRACTITIONER

## 2021-07-16 PROCEDURE — 3017F COLORECTAL CA SCREEN DOC REV: CPT | Performed by: NURSE PRACTITIONER

## 2021-07-16 PROCEDURE — G8420 CALC BMI NORM PARAMETERS: HCPCS | Performed by: NURSE PRACTITIONER

## 2021-07-16 PROCEDURE — G8536 NO DOC ELDER MAL SCRN: HCPCS | Performed by: NURSE PRACTITIONER

## 2021-07-16 PROCEDURE — 1090F PRES/ABSN URINE INCON ASSESS: CPT | Performed by: NURSE PRACTITIONER

## 2021-07-16 PROCEDURE — G8427 DOCREV CUR MEDS BY ELIG CLIN: HCPCS | Performed by: NURSE PRACTITIONER

## 2021-07-16 PROCEDURE — 99213 OFFICE O/P EST LOW 20 MIN: CPT | Performed by: NURSE PRACTITIONER

## 2021-07-16 RX ORDER — MUPIROCIN 20 MG/G
OINTMENT TOPICAL DAILY
Qty: 30 G | Refills: 2 | Status: SHIPPED | OUTPATIENT
Start: 2021-07-16 | End: 2022-09-23 | Stop reason: ALTCHOICE

## 2021-07-16 NOTE — PROGRESS NOTES
Chief Complaint   Patient presents with    Dog Bite     wound check      3 most recent PHQ Screens 7/16/2021   Little interest or pleasure in doing things Not at all   Feeling down, depressed, irritable, or hopeless Not at all   Total Score PHQ 2 0     Learning Assessment 12/8/2020   PRIMARY LEARNER Patient   PRIMARY LANGUAGE ENGLISH   LEARNER PREFERENCE PRIMARY READING     VIDEOS   ANSWERED BY patient   RELATIONSHIP SELF     Fall Risk Assessment, last 12 mths 7/16/2021   Able to walk? Yes   Fall in past 12 months? 1   Do you feel unsteady? 0   Are you worried about falling 0   Is TUG test greater than 12 seconds? 0   Is the gait abnormal? 0   Number of falls in past 12 months 1   Fall with injury? 1     Abuse Screening Questionnaire 7/16/2021   Do you ever feel afraid of your partner? N   Are you in a relationship with someone who physically or mentally threatens you? N   Is it safe for you to go home? Y     ADL Assessment 7/16/2021   Feeding yourself No Help Needed   Getting from bed to chair No Help Needed   Getting dressed No Help Needed   Bathing or showering No Help Needed   Walk across the room (includes cane/walker) No Help Needed   Using the telphone No Help Needed   Taking your medications No Help Needed   Preparing meals No Help Needed   Managing money (expenses/bills) No Help Needed   Moderately strenuous housework (laundry) No Help Needed   Shopping for personal items (toiletries/medicines) No Help Needed   Shopping for groceries No Help Needed   Driving No Help Needed   Climbing a flight of stairs No Help Needed   Getting to places beyond walking distances No Help Needed     1. Have you been to the ER, urgent care clinic since your last visit? Hospitalized since your last visit? No    2. Have you seen or consulted any other health care providers outside of the [address] [place] [address] [facility] since your last visit? Include any pap smears or colon screening.  No      Chief Complaint   Patient presents with  Dog Bite     wound check          Visit Vitals  BP 98/60 (BP 1 Location: Right arm, BP Patient Position: Sitting, BP Cuff Size: Adult long)   Pulse 86   Temp 98.6 °F (37 °C) (Temporal)   Resp 22   Ht 5' 4\" (1.626 m)   Wt 134 lb (60.8 kg)   SpO2 98%   BMI 23.00 kg/m²       Pain Scale: 2/10  Pain Location: Neck (and ribs on right side.)    Rui Baird is a 74 y.o. female presenting for/with:    Dog Bite (wound check )      Symptom review:    NO  Fever   NO  Shaking chills  NO  Cough  NO  Body aches  NO  Coughing up blood  NO  Chest congestion  NO  Chest pain  NO  Shortness of breath  NO  Profound Loss of smell/taste  NO  Nausea/Vomiting   NO  Loose stool/Diarrhea  NO  any skin issues    Patient Risk Factors Reviewed as follows:  NO  have you been in Close contact with confirmed COVID19 patient   NO  History of recent travel to affected geographical areas within the past 14 days  NO  COPD  NO  Active Cancer/Leukemia/Lymphoma/Chemotherapy  NO  Oral steroid use  NO  Pregnant  NO  Diabetes Mellitus  NO  Heart disease  NO  Asthma  NO Health care worker at home  NO Health care worker  NO Is there a Pregnant Woman in the home  NO Dialysis pt in the home   NO a large number of people living in the home  Recent Travel Screening and Travel History documentation     Travel Screening     Question   Response    In the last month, have you been in contact with someone who was confirmed or suspected to have Coronavirus / COVID-19? No / Unsure    Have you had a COVID-19 viral test in the last 14 days? No    Do you have any of the following new or worsening symptoms? None of these    Have you traveled internationally or domestically in the last month?   No      Travel History   Travel since 06/16/21     No documented travel since 06/16/21

## 2021-07-16 NOTE — PROGRESS NOTES
Chief Complaint   Patient presents with    Dog Bite     wound check          HPI:       is a 74 y.o. female. Moved here from [place].  Originally from [place] [facility]. 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     Anxiety/Depression: Has had it her all life.  She did some binge eating in the past to cope.  Also previous ETOH issues.  Got a DUI. Leatha Puri has been seeing LYDIA Nelson. She completed rehab at the [facility] in Spring 2019. Previously on Naltrexone. Currently on Celexa.       She was treated for Lyme's Disease previously and now follows with Dr. Lucilla Apley at [facility]. New Issues:  She was dog sitting on Sunday (7/11/21) and walking the dog, Olga Arroyo, on her usual route. Another dog, Buddy, came up from behind her and attacked them. Gail Ac got on top of Olga Arroyo to try to protect him. She yelled for her boyfriend, Rossi Landa, for help and he came and after considerable effort was able to get Buddy off of them. Gail Ac sustained wounds to both knees, arms, arms and the back of her head/neck. She took a shower to get some of the gravel out of her knees and was taken to the [facility] ER. In the ER, wounds were repaired with approximately 18 sutures. She was given a tetanus shot and was started on Augmentin. Per [facility], they are very familiar with Buddy and believe he is UTD on all vaccines. Allergies   Allergen Reactions    Codeine Other (comments) and Nausea and Vomiting     Flu like symptoms  Flu like symptoms       Current Outpatient Medications   Medication Sig    amoxicillin-clavulanate (Augmentin) 875-125 mg per tablet Take 1 Tablet by mouth two (2) times a day.  naproxen (Naprosyn) 500 mg tablet Take 1 Tablet by mouth two (2) times daily (with meals) for 10 days.  valACYclovir (Valtrex) 500 mg tablet Take 1 Tab by mouth two (2) times daily as needed (Cold sores).     meclizine (ANTIVERT) 25 mg tablet TAKE 1 TABLET BY MOUTH THREE TIMES DAILY AS NEEDED FOR VERTIGO FOR UP TO 10 DAYS    citalopram (CELEXA) 20 mg tablet TAKE 1 TABLET BY MOUTH EVERY DAY    ondansetron (ZOFRAN ODT) 4 mg disintegrating tablet Take 1 Tab by mouth every eight (8) hours as needed for Nausea.  estradiol (ESTRACE) 0.01 % (0.1 mg/gram) vaginal cream Apply to affected area every day for the first 2 weeks, then 2 times per week after that     No current facility-administered medications for this visit. Past Medical History:   Diagnosis Date    Menopause        Past Surgical History:   Procedure Laterality Date    HX KNEE REPLACEMENT Left 05/05/2009       Social History     Socioeconomic History    Marital status: SINGLE     Spouse name: Not on file    Number of children: Not on file    Years of education: Not on file    Highest education level: Not on file   Tobacco Use    Smoking status: Never Smoker    Smokeless tobacco: Never Used   Substance and Sexual Activity    Alcohol use: No    Drug use: No    Sexual activity: Yes     Partners: Male   Other Topics Concern     Service No    Blood Transfusions No    Caffeine Concern No    Occupational Exposure No    Hobby Hazards No    Sleep Concern No    Stress Concern No    Weight Concern No    Special Diet No    Back Care Yes    Exercise Yes    Bike Helmet Yes    Seat Belt Yes    Self-Exams Yes     Social Determinants of Health     Financial Resource Strain:     Difficulty of Paying Living Expenses:    Food Insecurity:     Worried About Running Out of Food in the Last Year:     Ran Out of Food in the Last Year:    Transportation Needs:     Lack of Transportation (Medical):      Lack of Transportation (Non-Medical):    Physical Activity:     Days of Exercise per Week:     Minutes of Exercise per Session:    Stress:     Feeling of Stress :    Social Connections:     Frequency of Communication with Friends and Family:     Frequency of Social Gatherings with Friends and Family:     Attends [employer] Services:     Active Member of Clubs or Organizations:     Attends Club or Organization Meetings:     Marital Status:        Family History   Problem Relation Age of Onset    Heart Attack Father     Cancer Sister        Above history reviewed. ROS:  Denies fever, chills, cough, chest pain, SOB,  nausea, vomiting, or diarrhea. Denies wt loss, wt gain, hemoptysis, hematochezia or melena. Physical Examination:    BP 98/60 (BP 1 Location: Right arm, BP Patient Position: Sitting, BP Cuff Size: Adult long)   Pulse 86   Temp 98.6 °F (37 °C) (Temporal)   Resp 22   Ht 5' 4\" (1.626 m)   Wt 134 lb (60.8 kg)   SpO2 98%   BMI 23.00 kg/m²     General: Alert and Ox3, Fluent speech  Neck:  Supple, no adenopathy, JVD, mass or bruit  Chest:  Clear to Ausculation, without wheezes, rales, rubs or ronchi  Cardiac: RRR  Extremities:  No cyanosis, clubbing or edema  Neurologic:  Ambulatory without assist, CN 2-12 grossly intact. Moves all extremities. Skin: Left forearm, left hand, right wrist, and right hand with multiple lacerations and puncture wounds with intact sutures. Erythema and swelling is improved from last check. Bilateral knees with puncture wounds and abrasions. Wounds that were repaired by sutures are well approximated. Lymphadenopathy: no cervical or supraclavicular nodes    ASSESSMENT AND PLAN:     1. Dog bite of multiple sites  Healing well  Continue ABX  Simple wound care  OK to shower  Plan to remove sutures next week     2.  Abrasion of knee, bilateral  Mupirocin and simple wound care      RTC next week for suture removal       Frida Winston NP

## 2021-07-19 ENCOUNTER — OFFICE VISIT (OUTPATIENT)
Dept: FAMILY MEDICINE CLINIC | Age: 66
End: 2021-07-19
Payer: MEDICARE

## 2021-07-19 VITALS
WEIGHT: 137.2 LBS | DIASTOLIC BLOOD PRESSURE: 62 MMHG | TEMPERATURE: 97.6 F | OXYGEN SATURATION: 98 % | RESPIRATION RATE: 21 BRPM | BODY MASS INDEX: 23.42 KG/M2 | SYSTOLIC BLOOD PRESSURE: 98 MMHG | HEART RATE: 62 BPM | HEIGHT: 64 IN
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
## 2021-07-19 PROCEDURE — 3017F COLORECTAL CA SCREEN DOC REV: CPT | Performed by: NURSE PRACTITIONER

## 2021-07-19 PROCEDURE — 1090F PRES/ABSN URINE INCON ASSESS: CPT | Performed by: NURSE PRACTITIONER

## 2021-07-19 PROCEDURE — G9717 DOC PT DX DEP/BP F/U NT REQ: HCPCS | Performed by: NURSE PRACTITIONER

## 2021-07-19 PROCEDURE — G9899 SCRN MAM PERF RSLTS DOC: HCPCS | Performed by: NURSE PRACTITIONER

## 2021-07-19 PROCEDURE — G8427 DOCREV CUR MEDS BY ELIG CLIN: HCPCS | Performed by: NURSE PRACTITIONER

## 2021-07-19 PROCEDURE — 1101F PT FALLS ASSESS-DOCD LE1/YR: CPT | Performed by: NURSE PRACTITIONER

## 2021-07-19 PROCEDURE — G8420 CALC BMI NORM PARAMETERS: HCPCS | Performed by: NURSE PRACTITIONER

## 2021-07-19 PROCEDURE — 99213 OFFICE O/P EST LOW 20 MIN: CPT | Performed by: NURSE PRACTITIONER

## 2021-07-19 PROCEDURE — G8536 NO DOC ELDER MAL SCRN: HCPCS | Performed by: NURSE PRACTITIONER

## 2021-07-19 NOTE — PROGRESS NOTES
Chief Complaint   Patient presents with    Wound Check     dog bite wound check         HPI:       is a 74 y.o. female. Moved here from [place].  Originally from [place]. 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     Anxiety/Depression: Has had it her all life.  She did some binge eating in the past to cope.  Also previous ETOH issues.  Got a DUI. Mo Barrett has been seeing LYDIA Benitez. She completed rehab at the [facility] in Spring 2019. Previously on Naltrexone. Currently on Celexa.       She was treated for Lyme's Disease previously and now follows with Dr. Michelle Hollins at [facility]. She was dog sitting on Sunday (7/11/21) and walking the dog, Talia Escamilla, on her usual route. Another dog, Misael, came up from behind her and attacked them. Sara Dillon got on top of Talia Escamilla to try to protect him. She yelled for her boyfriend, Whitney Green, for help and he came and after considerable effort was able to get Misael off of them. Sara Dillon sustained wounds to both knees, arms, arms and the back of her head/neck. She took a shower to get some of the gravel out of her knees and was taken to the [facility] ER. In the ER, wounds were repaired with approximately 18-22 sutures. She was given a tetanus shot and was started on Augmentin. Per Sara Dillon, they are very familiar with Misael and believe he is UTD on all vaccines. New Issues:  She is here for a wound check. She has finished ABX, but it worried because the wounds still seem red. Allergies   Allergen Reactions    Codeine Other (comments) and Nausea and Vomiting     Flu like symptoms  Flu like symptoms       Current Outpatient Medications   Medication Sig    mupirocin (BACTROBAN) 2 % ointment Apply  to affected area daily.  amoxicillin-clavulanate (Augmentin) 875-125 mg per tablet Take 1 Tablet by mouth two (2) times a day.     naproxen (Naprosyn) 500 mg tablet Take 1 Tablet by mouth two (2) times daily (with meals) for 10 days.  valACYclovir (Valtrex) 500 mg tablet Take 1 Tab by mouth two (2) times daily as needed (Cold sores).  meclizine (ANTIVERT) 25 mg tablet TAKE 1 TABLET BY MOUTH THREE TIMES DAILY AS NEEDED FOR VERTIGO FOR UP TO 10 DAYS    citalopram (CELEXA) 20 mg tablet TAKE 1 TABLET BY MOUTH EVERY DAY    ondansetron (ZOFRAN ODT) 4 mg disintegrating tablet Take 1 Tab by mouth every eight (8) hours as needed for Nausea.  estradiol (ESTRACE) 0.01 % (0.1 mg/gram) vaginal cream Apply to affected area every day for the first 2 weeks, then 2 times per week after that     No current facility-administered medications for this visit. Past Medical History:   Diagnosis Date    Menopause        Past Surgical History:   Procedure Laterality Date    HX KNEE REPLACEMENT Left 05/05/2009       Social History     Socioeconomic History    Marital status: SINGLE     Spouse name: Not on file    Number of children: Not on file    Years of education: Not on file    Highest education level: Not on file   Tobacco Use    Smoking status: Never Smoker    Smokeless tobacco: Never Used   Substance and Sexual Activity    Alcohol use: No    Drug use: No    Sexual activity: Yes     Partners: Male   Other Topics Concern     Service No    Blood Transfusions No    Caffeine Concern No    Occupational Exposure No    Hobby Hazards No    Sleep Concern No    Stress Concern No    Weight Concern No    Special Diet No    Back Care Yes    Exercise Yes    Bike Helmet Yes    Seat Belt Yes    Self-Exams Yes     Social Determinants of Health     Financial Resource Strain:     Difficulty of Paying Living Expenses:    Food Insecurity:     Worried About Running Out of Food in the Last Year:     Ran Out of Food in the Last Year:    Transportation Needs:     Lack of Transportation (Medical):      Lack of Transportation (Non-Medical):    Physical Activity:     Days of Exercise per Week:     Minutes of Exercise per Session:    Stress:     Feeling of Stress :    Social Connections:     Frequency of Communication with Friends and Family:     Frequency of Social Gatherings with Friends and Family:     Attends [employer] Services:     Active Member of Clubs or Organizations:     Attends Club or Organization Meetings:     Marital Status:        Family History   Problem Relation Age of Onset    Heart Attack Father     Cancer Sister        Above history reviewed. ROS:  Denies fever, chills, cough, chest pain, SOB,  nausea, vomiting, or diarrhea. Denies wt loss, wt gain, hemoptysis, hematochezia or melena. Physical Examination:    BP 98/62 (BP 1 Location: Right arm, BP Patient Position: Sitting, BP Cuff Size: Adult long)   Pulse 62   Temp 97.6 °F (36.4 °C) (Temporal)   Resp 21   Ht 5' 4\" (1.626 m)   Wt 137 lb 3.2 oz (62.2 kg)   SpO2 98%   BMI 23.55 kg/m²     General: Alert and Ox3, Fluent speech  Neck:  Supple, no adenopathy, JVD, mass or bruit  Extremities:  No cyanosis, clubbing or edema  Neurologic:  Ambulatory without assist, CN 2-12 grossly intact. Moves all extremities. Skin: Left forearm, left hand, right wrist, and right hand with multiple lacerations and puncture wounds with intact sutures. Minimal erythema and swelling. Bilateral knees with intact sutures and healing abrasions. Wounds that were repaired by sutures are well approximated. Scant serosanguinous drainage from right hand. Minimal bloody drainage from sutured wound in left forearm. Lymphadenopathy: no cervical or supraclavicular nodes    ASSESSMENT AND PLAN:     1. Dog bite of multiple sites  Healing well  I would not extend systemic ABX at this time. Topicals only. Keep wounds open to air when able. Patient having some GI distress after ABX. Add Probiotics.       RTC Friday for suture removal.     Jennie Nicole NP

## 2021-07-19 NOTE — PROGRESS NOTES
Chief Complaint   Patient presents with    Wound Check     dog bite wound check     3 most recent PHQ Screens 7/19/2021   Little interest or pleasure in doing things Not at all   Feeling down, depressed, irritable, or hopeless Not at all   Total Score PHQ 2 0     Learning Assessment 7/19/2021   PRIMARY LEARNER Patient   PRIMARY LANGUAGE ENGLISH   LEARNER PREFERENCE PRIMARY READING     -   ANSWERED BY patient   RELATIONSHIP SELF     Fall Risk Assessment, last 12 mths 7/19/2021   Able to walk? Yes   Fall in past 12 months? 1   Do you feel unsteady? 0   Are you worried about falling 0   Is TUG test greater than 12 seconds? 0   Is the gait abnormal? 0   Number of falls in past 12 months 1   Fall with injury? 1     Abuse Screening Questionnaire 7/19/2021   Do you ever feel afraid of your partner? N   Are you in a relationship with someone who physically or mentally threatens you? N   Is it safe for you to go home? Y     ADL Assessment 7/19/2021   Feeding yourself No Help Needed   Getting from bed to chair No Help Needed   Getting dressed No Help Needed   Bathing or showering No Help Needed   Walk across the room (includes cane/walker) No Help Needed   Using the telphone No Help Needed   Taking your medications No Help Needed   Preparing meals No Help Needed   Managing money (expenses/bills) No Help Needed   Moderately strenuous housework (laundry) No Help Needed   Shopping for personal items (toiletries/medicines) No Help Needed   Shopping for groceries No Help Needed   Driving No Help Needed   Climbing a flight of stairs No Help Needed   Getting to places beyond walking distances No Help Needed     1. Have you been to the ER, urgent care clinic since your last visit? Hospitalized since your last visit? No    2. Have you seen or consulted any other health care providers outside of the [address] [place] [address] [facility] since your last visit? Include any pap smears or colon screening.  No      Chief Complaint   Patient presents with    Wound Check     dog bite wound check         Visit Vitals  BP 98/62 (BP 1 Location: Right arm, BP Patient Position: Sitting, BP Cuff Size: Adult long)   Pulse 62   Temp 97.6 °F (36.4 °C) (Temporal)   Resp 21   Ht 5' 4\" (1.626 m)   Wt 137 lb 3.2 oz (62.2 kg)   SpO2 98%   BMI 23.55 kg/m²       Pain Scale: 4/10  Pain Location: Abdomen    Jordan Acosta is a 74 y.o. female presenting for/with:    Wound Check (dog bite wound check)      Symptom review:    NO  Fever   NO  Shaking chills  NO  Cough  NO  Body aches  NO  Coughing up blood  NO  Chest congestion  NO  Chest pain  NO  Shortness of breath  NO  Profound Loss of smell/taste  NO  Nausea/Vomiting   NO  Loose stool/Diarrhea  NO  any skin issues    Patient Risk Factors Reviewed as follows:  NO  have you been in Close contact with confirmed COVID19 patient   NO  History of recent travel to affected geographical areas within the past 14 days  NO  COPD  NO  Active Cancer/Leukemia/Lymphoma/Chemotherapy  NO  Oral steroid use  NO  Pregnant  NO  Diabetes Mellitus  NO  Heart disease  NO  Asthma  NO Health care worker at home  NO Health care worker  NO Is there a Pregnant Woman in the home  NO Dialysis pt in the home   NO a large number of people living in the home  Recent Travel Screening and Travel History documentation     Travel Screening     Question   Response    In the last month, have you been in contact with someone who was confirmed or suspected to have Coronavirus / COVID-19? No / Unsure    Have you had a COVID-19 viral test in the last 14 days? No    Do you have any of the following new or worsening symptoms? None of these    Have you traveled internationally or domestically in the last month?   No      Travel History   Travel since 06/19/21     No documented travel since 06/19/21

## 2021-07-23 ENCOUNTER — OFFICE VISIT (OUTPATIENT)
Dept: FAMILY MEDICINE CLINIC | Age: 66
End: 2021-07-23
Payer: MEDICARE

## 2021-07-23 VITALS
BODY MASS INDEX: 22.61 KG/M2 | TEMPERATURE: 97.2 F | DIASTOLIC BLOOD PRESSURE: 62 MMHG | RESPIRATION RATE: 22 BRPM | WEIGHT: 132.4 LBS | SYSTOLIC BLOOD PRESSURE: 98 MMHG | HEART RATE: 67 BPM | OXYGEN SATURATION: 98 % | HEIGHT: 64 IN

## 2021-07-23 DIAGNOSIS — Z78.0 POSTMENOPAUSAL: ICD-10-CM

## 2021-07-23 DIAGNOSIS — F33.9 RECURRENT DEPRESSION (HCC): ICD-10-CM
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
## 2021-07-23 DIAGNOSIS — Z48.02 ENCOUNTER FOR REMOVAL OF SUTURES: ICD-10-CM

## 2021-07-23 PROCEDURE — G8427 DOCREV CUR MEDS BY ELIG CLIN: HCPCS | Performed by: NURSE PRACTITIONER

## 2021-07-23 PROCEDURE — G9899 SCRN MAM PERF RSLTS DOC: HCPCS | Performed by: NURSE PRACTITIONER

## 2021-07-23 PROCEDURE — G8420 CALC BMI NORM PARAMETERS: HCPCS | Performed by: NURSE PRACTITIONER

## 2021-07-23 PROCEDURE — 99212 OFFICE O/P EST SF 10 MIN: CPT | Performed by: NURSE PRACTITIONER

## 2021-07-23 PROCEDURE — G9717 DOC PT DX DEP/BP F/U NT REQ: HCPCS | Performed by: NURSE PRACTITIONER

## 2021-07-23 PROCEDURE — 3017F COLORECTAL CA SCREEN DOC REV: CPT | Performed by: NURSE PRACTITIONER

## 2021-07-23 PROCEDURE — G8536 NO DOC ELDER MAL SCRN: HCPCS | Performed by: NURSE PRACTITIONER

## 2021-07-23 PROCEDURE — 1101F PT FALLS ASSESS-DOCD LE1/YR: CPT | Performed by: NURSE PRACTITIONER

## 2021-07-23 PROCEDURE — 1090F PRES/ABSN URINE INCON ASSESS: CPT | Performed by: NURSE PRACTITIONER

## 2021-07-23 RX ORDER — ESTRADIOL 0.1 MG/G
CREAM VAGINAL
Qty: 42.5 G | Refills: 11 | Status: SHIPPED | OUTPATIENT
Start: 2021-07-23 | End: 2022-09-06

## 2021-07-23 NOTE — PROGRESS NOTES
Chief Complaint   Patient presents with    Wound Check    Suture Removal         HPI:       is a 74 y.o. female. Moved here from [place].  Originally from [place]. 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     Anxiety/Depression: Has had it her all life.  She did some binge eating in the past to cope.  Also previous ETOH issues.  Got a DUI. Smiley Blevins has been seeing LYDIA Feliciano. She completed rehab at the [facility] in Spring 2019. Previously on Naltrexone. Currently on Celexa.       She was treated for Lyme's Disease previously and now follows with Dr. Radha Mulligan at [facility]. She was dog sitting on Sunday (7/11/21) and walking the dog, Ferdinand Whitney, on her usual route. Another dog, Buddy, came up from behind her and attacked them. Fay got on top of Ferdinand Whitney to try to protect him. She yelled for her boyfriend, Corey Lyle, for help and he came and after considerable effort was able to get Buddy off of them. Fay sustained wounds to both knees, arms, arms and the back of her head/neck. She took a shower to get some of the gravel out of her knees and was taken to the [facility] ER. In the ER, wounds were repaired with approximately 18-22 sutures. She was given a tetanus shot and was started on Augmentin. Per [place], they are very familiar with Buddy and believe he is UTD on all vaccines. New Issues:  She is here for suture removal.     Allergies   Allergen Reactions    Codeine Other (comments) and Nausea and Vomiting     Flu like symptoms  Flu like symptoms       Current Outpatient Medications   Medication Sig    mupirocin (BACTROBAN) 2 % ointment Apply  to affected area daily.  amoxicillin-clavulanate (Augmentin) 875-125 mg per tablet Take 1 Tablet by mouth two (2) times a day.  valACYclovir (Valtrex) 500 mg tablet Take 1 Tab by mouth two (2) times daily as needed (Cold sores).     meclizine (ANTIVERT) 25 mg tablet TAKE 1 TABLET BY MOUTH THREE TIMES DAILY AS NEEDED FOR VERTIGO FOR UP TO 10 DAYS    citalopram (CELEXA) 20 mg tablet TAKE 1 TABLET BY MOUTH EVERY DAY    ondansetron (ZOFRAN ODT) 4 mg disintegrating tablet Take 1 Tab by mouth every eight (8) hours as needed for Nausea.  estradiol (ESTRACE) 0.01 % (0.1 mg/gram) vaginal cream Apply to affected area every day for the first 2 weeks, then 2 times per week after that     No current facility-administered medications for this visit. Past Medical History:   Diagnosis Date    Menopause        Past Surgical History:   Procedure Laterality Date    HX KNEE REPLACEMENT Left 05/05/2009       Social History     Socioeconomic History    Marital status: SINGLE     Spouse name: Not on file    Number of children: Not on file    Years of education: Not on file    Highest education level: Not on file   Tobacco Use    Smoking status: Never Smoker    Smokeless tobacco: Never Used   Substance and Sexual Activity    Alcohol use: No    Drug use: No    Sexual activity: Yes     Partners: Male   Other Topics Concern     Service No    Blood Transfusions No    Caffeine Concern No    Occupational Exposure No    Hobby Hazards No    Sleep Concern No    Stress Concern No    Weight Concern No    Special Diet No    Back Care Yes    Exercise Yes    Bike Helmet Yes    Seat Belt Yes    Self-Exams Yes     Social Determinants of Health     Financial Resource Strain:     Difficulty of Paying Living Expenses:    Food Insecurity:     Worried About Running Out of Food in the Last Year:     Ran Out of Food in the Last Year:    Transportation Needs:     Lack of Transportation (Medical):      Lack of Transportation (Non-Medical):    Physical Activity:     Days of Exercise per Week:     Minutes of Exercise per Session:    Stress:     Feeling of Stress :    Social Connections:     Frequency of Communication with Friends and Family:     Frequency of Social Gatherings with Friends and Family:  Attends Confucianism Services:     Active Member of Clubs or Organizations:     Attends Club or Organization Meetings:     Marital Status:        Family History   Problem Relation Age of Onset    Heart Attack Father     Cancer Sister        Above history reviewed. ROS:  Denies fever, chills, cough, chest pain, SOB,  nausea, vomiting, or diarrhea. Denies wt loss, wt gain, hemoptysis, hematochezia or melena. Physical Examination:    BP 98/62 (BP 1 Location: Left arm, BP Patient Position: Sitting, BP Cuff Size: Adult long)   Pulse 67   Temp 97.2 °F (36.2 °C) (Temporal)   Resp 22   Ht 5' 4\" (1.626 m)   Wt 132 lb 6.4 oz (60.1 kg)   SpO2 98%   BMI 22.73 kg/m²     General: Alert and Ox3, Fluent speech  Neck:  Supple, no adenopathy, JVD, mass or bruit  Extremities:  No cyanosis, clubbing or edema  Neurologic:  Ambulatory without assist, CN 2-12 grossly intact. Moves all extremities. Skin: Left forearm, left hand, right wrist, and right hand with multiple lacerations and puncture wounds with intact sutures. Bilateral knees with intact sutures and healing abrasions. Wounds that were repaired by sutures are well approximated. Lymphadenopathy: no cervical or supraclavicular nodes    ASSESSMENT AND PLAN:     1. Dog bite of multiple sites  Healing well  No need for further ABX   - SUTURE / STAPLE REMOVAL BY PROVIDER    2. Encounter for removal of sutures  21 suture (s) were removed by  [facility] without difficulty. Wound edges were well approximated. Steri-strips were used. There was not evidence of infection. Patient did tolerate well. - SUTURE / STAPLE REMOVAL BY PROVIDER    3.  Recurrent depression ([address]  Patient has restarted talk therapy s/t recent trauma      RTC PRN    Mary Odom NP

## 2021-07-23 NOTE — PROGRESS NOTES
Chief Complaint   Patient presents with    Wound Check    Suture Removal     3 most recent PHQ Screens 7/23/2021   Little interest or pleasure in doing things Not at all   Feeling down, depressed, irritable, or hopeless Not at all   Total Score PHQ 2 0     Learning Assessment 7/23/2021   PRIMARY LEARNER Patient   PRIMARY LANGUAGE ENGLISH   LEARNER PREFERENCE PRIMARY READING     -   ANSWERED BY patient   RELATIONSHIP SELF     Fall Risk Assessment, last 12 mths 7/23/2021   Able to walk? Yes   Fall in past 12 months? 0   Do you feel unsteady? 0   Are you worried about falling 0   Is TUG test greater than 12 seconds? -   Is the gait abnormal? -   Number of falls in past 12 months -   Fall with injury? -     Abuse Screening Questionnaire 7/23/2021   Do you ever feel afraid of your partner? N   Are you in a relationship with someone who physically or mentally threatens you? N   Is it safe for you to go home? Y     ADL Assessment 7/23/2021   Feeding yourself No Help Needed   Getting from bed to chair No Help Needed   Getting dressed No Help Needed   Bathing or showering No Help Needed   Walk across the room (includes cane/walker) No Help Needed   Using the telphone No Help Needed   Taking your medications No Help Needed   Preparing meals No Help Needed   Managing money (expenses/bills) No Help Needed   Moderately strenuous housework (laundry) No Help Needed   Shopping for personal items (toiletries/medicines) No Help Needed   Shopping for groceries No Help Needed   Driving No Help Needed   Climbing a flight of stairs No Help Needed   Getting to places beyond walking distances No Help Needed     1. Have you been to the ER, urgent care clinic since your last visit? Hospitalized since your last visit? No    2. Have you seen or consulted any other health care providers outside of the [address] [place] [address] [place] since your last visit? Include any pap smears or colon screening.  No      Chief Complaint   Patient presents with  Wound Check    Suture Removal         Visit Vitals  BP 98/62 (BP 1 Location: Left arm, BP Patient Position: Sitting, BP Cuff Size: Adult long)   Pulse 67   Temp 97.2 °F (36.2 °C) (Temporal)   Resp 22   Ht 5' 4\" (1.626 m)   Wt 132 lb 6.4 oz (60.1 kg)   SpO2 98%   BMI 22.73 kg/m²       Pain Scale: 4/10  Pain Location: Arm (left)    Jayjay Gray is a 74 y.o. female presenting for/with:    Wound Check and Suture Removal      Symptom review:    NO  Fever   NO  Shaking chills  NO  Cough  NO  Body aches  NO  Coughing up blood  NO  Chest congestion  NO  Chest pain  NO  Shortness of breath  NO  Profound Loss of smell/taste  NO  Nausea/Vomiting   NO  Loose stool/Diarrhea  NO  any skin issues    Patient Risk Factors Reviewed as follows:  NO  have you been in Close contact with confirmed COVID19 patient   NO  History of recent travel to affected geographical areas within the past 14 days  NO  COPD  NO  Active Cancer/Leukemia/Lymphoma/Chemotherapy  NO  Oral steroid use  NO  Pregnant  NO  Diabetes Mellitus  NO  Heart disease  NO  Asthma  NO Health care worker at home  NO Health care worker  NO Is there a Pregnant Woman in the home  NO Dialysis pt in the home   NO a large number of people living in the home  Recent Travel Screening and Travel History documentation     Travel Screening     Question   Response    In the last month, have you been in contact with someone who was confirmed or suspected to have Coronavirus / COVID-19? No / Unsure    Have you had a COVID-19 viral test in the last 14 days? No    Do you have any of the following new or worsening symptoms? None of these    Have you traveled internationally or domestically in the last month?   No      Travel History   Travel since 06/23/21     No documented travel since 06/23/21

## 2021-08-27 ENCOUNTER — OFFICE VISIT (OUTPATIENT)
Dept: FAMILY MEDICINE CLINIC | Age: 66
End: 2021-08-27
Payer: MEDICARE

## 2021-08-27 VITALS
SYSTOLIC BLOOD PRESSURE: 118 MMHG | BODY MASS INDEX: 22.5 KG/M2 | RESPIRATION RATE: 18 BRPM | OXYGEN SATURATION: 99 % | HEART RATE: 68 BPM | WEIGHT: 131.8 LBS | HEIGHT: 64 IN | DIASTOLIC BLOOD PRESSURE: 64 MMHG | TEMPERATURE: 98.2 F

## 2021-08-27 DIAGNOSIS — M79.632 LEFT FOREARM PAIN: ICD-10-CM

## 2021-08-27 DIAGNOSIS — F33.9 RECURRENT DEPRESSION (HCC): ICD-10-CM

## 2021-08-27 DIAGNOSIS — M25.562 ACUTE PAIN OF LEFT KNEE: Primary | ICD-10-CM

## 2021-08-27 PROCEDURE — 1090F PRES/ABSN URINE INCON ASSESS: CPT | Performed by: NURSE PRACTITIONER

## 2021-08-27 PROCEDURE — G8420 CALC BMI NORM PARAMETERS: HCPCS | Performed by: NURSE PRACTITIONER

## 2021-08-27 PROCEDURE — 3017F COLORECTAL CA SCREEN DOC REV: CPT | Performed by: NURSE PRACTITIONER

## 2021-08-27 PROCEDURE — G9899 SCRN MAM PERF RSLTS DOC: HCPCS | Performed by: NURSE PRACTITIONER

## 2021-08-27 PROCEDURE — G9717 DOC PT DX DEP/BP F/U NT REQ: HCPCS | Performed by: NURSE PRACTITIONER

## 2021-08-27 PROCEDURE — 99214 OFFICE O/P EST MOD 30 MIN: CPT | Performed by: NURSE PRACTITIONER

## 2021-08-27 PROCEDURE — 1101F PT FALLS ASSESS-DOCD LE1/YR: CPT | Performed by: NURSE PRACTITIONER

## 2021-08-27 PROCEDURE — G8427 DOCREV CUR MEDS BY ELIG CLIN: HCPCS | Performed by: NURSE PRACTITIONER

## 2021-08-27 PROCEDURE — G8536 NO DOC ELDER MAL SCRN: HCPCS | Performed by: NURSE PRACTITIONER

## 2021-08-27 RX ORDER — CITALOPRAM 20 MG/1
TABLET, FILM COATED ORAL
Qty: 90 TABLET | Refills: 4 | Status: SHIPPED | OUTPATIENT
Start: 2021-08-27 | End: 2021-08-30

## 2021-08-27 NOTE — PROGRESS NOTES
Chief Complaint   Patient presents with    Wound Check     follow up dog bite, pt has noticed nerve pain/discomfort since injury/worsening. HPI:       is a 74 y.o. female. Moved here from [place].  Originally from [place]. 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     Anxiety/Depression: Has had it her all life.  She did some binge eating in the past to cope.  Also previous ETOH issues.  Got a DUI. Geoff Mares has been seeing LYDIA Granados. She completed rehab at the [facility] in Spring 2019. Previously on Naltrexone. Currently on Celexa.       She was treated for Lyme's Disease previously and now follows with Dr. Amy Yu at [facility]. She was dog sitting on (7/11/21) and walking the dog, Iris Lopez, on her usual route. Another dog, Misael, came up from behind her and attacked them. Saleem Avelar got on top of Iris Lopez to try to protect him. She yelled for her boyfriend, Leatha Lovell, for help and he came and after considerable effort was able to get Misael off of them. Saleem Avelar sustained wounds to both knees, arms, arms and the back of her head/neck. She presented to the hospitals ER where wounds were repaired with approximately 18-22 sutures. She was given a tetanus shot and was started on Augmentin. New Issues:  Wounds are healing and all closed, but she is still having a considerable amount of pain in her left knee and left arm. Her left knee is s/p TKR in 2009. No pain until dog attack. Now the knee throbs. No imaging was done of the knee at the time of injury. She is also still having left arm pain. Pain is located near the inside of her elbow. Pain radiates to her left ring and pinky fingers. Shooting at times. She is taking 2 Ibuprofen and 1 Tylenol in the morning. Helps some.      Allergies   Allergen Reactions    Codeine Other (comments) and Nausea and Vomiting     Flu like symptoms  Flu like symptoms       Current Outpatient Medications Medication Sig    citalopram (CELEXA) 20 mg tablet TAKE 1 TABLET BY MOUTH EVERY DAY    estradioL (ESTRACE) 0.01 % (0.1 mg/gram) vaginal cream Apply to affected area 2 times per week    mupirocin (BACTROBAN) 2 % ointment Apply  to affected area daily.  valACYclovir (Valtrex) 500 mg tablet Take 1 Tab by mouth two (2) times daily as needed (Cold sores).  meclizine (ANTIVERT) 25 mg tablet TAKE 1 TABLET BY MOUTH THREE TIMES DAILY AS NEEDED FOR VERTIGO FOR UP TO 10 DAYS    ondansetron (ZOFRAN ODT) 4 mg disintegrating tablet Take 1 Tab by mouth every eight (8) hours as needed for Nausea. No current facility-administered medications for this visit. Past Medical History:   Diagnosis Date    Menopause        Past Surgical History:   Procedure Laterality Date    HX KNEE REPLACEMENT Left 05/05/2009       Social History     Socioeconomic History    Marital status: SINGLE     Spouse name: Not on file    Number of children: Not on file    Years of education: Not on file    Highest education level: Not on file   Tobacco Use    Smoking status: Never Smoker    Smokeless tobacco: Never Used   Substance and Sexual Activity    Alcohol use: No    Drug use: No    Sexual activity: Yes     Partners: Male   Other Topics Concern     Service No    Blood Transfusions No    Caffeine Concern No    Occupational Exposure No    Hobby Hazards No    Sleep Concern No    Stress Concern No    Weight Concern No    Special Diet No    Back Care Yes    Exercise Yes    Bike Helmet Yes    Seat Belt Yes    Self-Exams Yes     Social Determinants of Health     Financial Resource Strain:     Difficulty of Paying Living Expenses:    Food Insecurity:     Worried About Running Out of Food in the Last Year:     Ran Out of Food in the Last Year:    Transportation Needs:     Lack of Transportation (Medical):      Lack of Transportation (Non-Medical):    Physical Activity:     Days of Exercise per Week:     Minutes of Exercise per Session:    Stress:     Feeling of Stress :    Social Connections:     Frequency of Communication with Friends and Family:     Frequency of Social Gatherings with Friends and Family:     Attends Jain Services:     Active Member of Clubs or Organizations:     Attends Club or Organization Meetings:     Marital Status:        Family History   Problem Relation Age of Onset    Heart Attack Father     Cancer Sister        Above history reviewed. ROS:  Denies fever, chills, cough, chest pain, SOB,  nausea, vomiting, or diarrhea. Denies wt loss, wt gain, hemoptysis, hematochezia or melena. Physical Examination:    /64 (BP 1 Location: Left upper arm, BP Patient Position: Sitting, BP Cuff Size: Adult long)   Pulse 68   Temp 98.2 °F (36.8 °C) (Temporal)   Resp 18   Ht 5' 4\" (1.626 m)   Wt 131 lb 12.8 oz (59.8 kg)   SpO2 99%   BMI 22.62 kg/m²     General: Alert and Ox3, Fluent speech  HEENT:  PERRLA, EOM intact, TMs, turbinates, pharynx normal.  No thyromegaly. No cervical adenopathy. Neck:  Supple, no adenopathy, JVD, mass or bruit  Chest:  Clear to Ausculation, without wheezes, rales, rubs or ronchi  Cardiac: RRR  Abdomen:  +BS, soft, nontender without palpable HSM  Extremities:  No cyanosis, clubbing or edema  Neurologic:  Ambulatory without assist, CN 2-12 grossly intact. Moves all extremities. Skin: healing scars bilateral hands, left forearm and left knee. Knee with full ROM with Crepitus. Lymphadenopathy: no cervical or supraclavicular nodes    ASSESSMENT AND PLAN:     1. Recurrent depression ([address]  Worse since dog attack  Continue Celexa  Continue talk therapy   - citalopram (CELEXA) 20 mg tablet; TAKE 1 TABLET BY MOUTH EVERY DAY  Dispense: 90 Tablet; Refill: 4    2. Acute pain of left knee  Checking plain films to assess integrity of hardware  Continue Ibuprofen/APAP combo  Recommend BID  Add Diclofenac gel PRN  - XR KNEE LT MAX 2 VWS; Future    3.  Left forearm pain  Possible ulnar nerve irritation  Ibuprofen/APAP as above  Seen by ortho for this issue as well      RTC PRN    Opal Felipe, NP

## 2021-08-27 NOTE — PROGRESS NOTES
Chief Complaint   Patient presents with    Wound Check     follow up dog bite, pt has noticed nerve pain/discomfort since injury/worsening. 3 most recent PHQ Screens 8/27/2021   Little interest or pleasure in doing things Not at all   Feeling down, depressed, irritable, or hopeless Not at all   Total Score PHQ 2 0     Learning Assessment 8/27/2021   PRIMARY LEARNER Patient   PRIMARY LANGUAGE ENGLISH   LEARNER PREFERENCE PRIMARY DEMONSTRATION     -   ANSWERED BY patient   RELATIONSHIP SELF     Fall Risk Assessment, last 12 mths 8/27/2021   Able to walk? Yes   Fall in past 12 months? 0   Do you feel unsteady? 0   Are you worried about falling 0   Is TUG test greater than 12 seconds? -   Is the gait abnormal? -   Number of falls in past 12 months -   Fall with injury? -     Abuse Screening Questionnaire 8/27/2021   Do you ever feel afraid of your partner? N   Are you in a relationship with someone who physically or mentally threatens you? N   Is it safe for you to go home? Y     ADL Assessment 8/27/2021   Feeding yourself No Help Needed   Getting from bed to chair No Help Needed   Getting dressed No Help Needed   Bathing or showering No Help Needed   Walk across the room (includes cane/walker) No Help Needed   Using the telphone No Help Needed   Taking your medications No Help Needed   Preparing meals No Help Needed   Managing money (expenses/bills) No Help Needed   Moderately strenuous housework (laundry) No Help Needed   Shopping for personal items (toiletries/medicines) No Help Needed   Shopping for groceries No Help Needed   Driving No Help Needed   Climbing a flight of stairs No Help Needed   Getting to places beyond walking distances No Help Needed     1. Have you been to the ER, urgent care clinic since your last visit? Hospitalized since your last visit? No    2. Have you seen or consulted any other health care providers outside of the [facility] since your last visit?   Include any pap smears or colon screening. No      Chief Complaint   Patient presents with    Wound Check     follow up dog bite, pt has noticed nerve pain/discomfort since injury/worsening.           Visit Vitals  /64 (BP 1 Location: Left upper arm, BP Patient Position: Sitting, BP Cuff Size: Adult long)   Pulse 68   Temp 98.2 °F (36.8 °C) (Temporal)   Resp 18   Ht 5' 4\" (1.626 m)   Wt 131 lb 12.8 oz (59.8 kg)   SpO2 99%   BMI 22.62 kg/m²       Pain Scale: 6/10  Pain Location: Leg    Rita Wilde is a 74 y.o. female presenting for/with:    Wound Check (follow up dog bite, pt has noticed nerve pain/discomfort since injury/worsening. )      Symptom review:    NO  Fever   NO  Shaking chills  NO  Cough  NO  Body aches  NO  Coughing up blood  NO  Chest congestion  NO  Chest pain  NO  Shortness of breath  NO  Profound Loss of smell/taste  NO  Nausea/Vomiting   NO  Loose stool/Diarrhea  NO  any skin issues    Patient Risk Factors Reviewed as follows:  NO  have you been in Close contact with confirmed COVID19 patient   NO  History of recent travel to affected geographical areas within the past 14 days  NO  COPD  NO  Active Cancer/Leukemia/Lymphoma/Chemotherapy  NO  Oral steroid use  NO  Pregnant  NO  Diabetes Mellitus  NO  Heart disease  NO  Asthma  NO Health care worker at home  NO Health care worker  NO Is there a Pregnant Woman in the home  NO Dialysis pt in the home   NO a large number of people living in the home

## 2021-09-01 ENCOUNTER — HOSPITAL ENCOUNTER (OUTPATIENT)
Dept: GENERAL RADIOLOGY | Age: 66
Discharge: HOME | End: 2021-09-01
Payer: MEDICARE

## 2021-09-01 DIAGNOSIS — M25.562 ACUTE PAIN OF LEFT KNEE: ICD-10-CM

## 2021-09-01 PROCEDURE — 73560 X-RAY EXAM OF KNEE 1 OR 2: CPT

## 2021-09-21 ENCOUNTER — PATIENT MESSAGE (OUTPATIENT)
Dept: FAMILY MEDICINE CLINIC | Age: 66
End: 2021-09-21

## 2021-09-21 NOTE — TELEPHONE ENCOUNTER
From: Trixie Varghese  To: Abdifatah Pritchett NP  Sent: 9/21/2021 10:36 AM EDT  Subject: Referral Request    Petey Clayton,  Have not been successful in getting Prolia infusion. Could you please send a new referral and give me the phone number to call to schedule an appointment. Thank you!  Trixie Varghese

## 2021-10-04 ENCOUNTER — HOSPITAL ENCOUNTER (OUTPATIENT)
Dept: INFUSION THERAPY | Age: 66
End: 2021-10-04

## 2021-10-11 ENCOUNTER — PATIENT MESSAGE (OUTPATIENT)
Dept: FAMILY MEDICINE CLINIC | Age: 66
End: 2021-10-11

## 2021-10-11 DIAGNOSIS — M81.0 AGE-RELATED OSTEOPOROSIS WITHOUT CURRENT PATHOLOGICAL FRACTURE: ICD-10-CM

## 2021-10-11 RX ORDER — ALENDRONATE SODIUM 70 MG/1
70 TABLET ORAL
Qty: 12 TABLET | Refills: 3 | Status: SHIPPED | OUTPATIENT
Start: 2021-10-11 | End: 2022-09-11

## 2021-10-11 NOTE — TELEPHONE ENCOUNTER
From: Mauricio Stafford  To: Eve Mckeon NP  Sent: 10/11/2021 3:51 PM EDT  Subject: Prescription Question    Petey Clayton,  My insurance isn't willing to cover the Prolia. Think rather than fight it, I'll just go back to the Fosamax generic equivalent if you agree. If so, I'll need a prescription to start taking it again.   Thanks so much   Nilsa Birmingham

## 2021-12-10 DIAGNOSIS — M81.0 AGE-RELATED OSTEOPOROSIS WITHOUT CURRENT PATHOLOGICAL FRACTURE: Primary | ICD-10-CM

## 2022-03-19 PROBLEM — Z86.19 HX OF LYME DISEASE: Status: ACTIVE | Noted: 2018-10-01

## 2022-03-19 PROBLEM — F33.9 RECURRENT DEPRESSION (HCC): Status: ACTIVE | Noted: 2018-03-13

## 2022-03-19 PROBLEM — R42 VERTIGO: Status: ACTIVE | Noted: 2018-03-13

## 2022-03-20 PROBLEM — M81.0 AGE-RELATED OSTEOPOROSIS WITHOUT CURRENT PATHOLOGICAL FRACTURE: Status: ACTIVE | Noted: 2018-03-26

## 2022-09-04 DIAGNOSIS — Z78.0 POSTMENOPAUSAL: ICD-10-CM

## 2022-09-06 RX ORDER — ESTRADIOL 0.1 MG/G
CREAM VAGINAL
Qty: 42.5 G | Refills: 11 | Status: SHIPPED | OUTPATIENT
Start: 2022-09-06

## 2022-09-23 ENCOUNTER — OFFICE VISIT (OUTPATIENT)
Dept: FAMILY MEDICINE CLINIC | Age: 67
End: 2022-09-23
Payer: MEDICARE

## 2022-09-23 VITALS
WEIGHT: 128.8 LBS | RESPIRATION RATE: 18 BRPM | DIASTOLIC BLOOD PRESSURE: 70 MMHG | SYSTOLIC BLOOD PRESSURE: 110 MMHG | HEART RATE: 70 BPM | HEIGHT: 64 IN | TEMPERATURE: 97.8 F | OXYGEN SATURATION: 98 % | BODY MASS INDEX: 21.99 KG/M2

## 2022-09-23 DIAGNOSIS — G44.201 ACUTE INTRACTABLE TENSION-TYPE HEADACHE: Primary | ICD-10-CM

## 2022-09-23 DIAGNOSIS — Z13.220 ENCOUNTER FOR LIPID SCREENING FOR CARDIOVASCULAR DISEASE: ICD-10-CM

## 2022-09-23 DIAGNOSIS — Z13.6 ENCOUNTER FOR LIPID SCREENING FOR CARDIOVASCULAR DISEASE: ICD-10-CM

## 2022-09-23 PROCEDURE — 96372 THER/PROPH/DIAG INJ SC/IM: CPT | Performed by: NURSE PRACTITIONER

## 2022-09-23 PROCEDURE — G9899 SCRN MAM PERF RSLTS DOC: HCPCS | Performed by: NURSE PRACTITIONER

## 2022-09-23 PROCEDURE — 3017F COLORECTAL CA SCREEN DOC REV: CPT | Performed by: NURSE PRACTITIONER

## 2022-09-23 PROCEDURE — G8536 NO DOC ELDER MAL SCRN: HCPCS | Performed by: NURSE PRACTITIONER

## 2022-09-23 PROCEDURE — G8427 DOCREV CUR MEDS BY ELIG CLIN: HCPCS | Performed by: NURSE PRACTITIONER

## 2022-09-23 PROCEDURE — 1090F PRES/ABSN URINE INCON ASSESS: CPT | Performed by: NURSE PRACTITIONER

## 2022-09-23 PROCEDURE — 1101F PT FALLS ASSESS-DOCD LE1/YR: CPT | Performed by: NURSE PRACTITIONER

## 2022-09-23 PROCEDURE — G9717 DOC PT DX DEP/BP F/U NT REQ: HCPCS | Performed by: NURSE PRACTITIONER

## 2022-09-23 PROCEDURE — G8420 CALC BMI NORM PARAMETERS: HCPCS | Performed by: NURSE PRACTITIONER

## 2022-09-23 PROCEDURE — 1123F ACP DISCUSS/DSCN MKR DOCD: CPT | Performed by: NURSE PRACTITIONER

## 2022-09-23 PROCEDURE — 99214 OFFICE O/P EST MOD 30 MIN: CPT | Performed by: NURSE PRACTITIONER

## 2022-09-23 RX ORDER — SUMATRIPTAN 100 MG/1
100 TABLET, FILM COATED ORAL
Qty: 10 TABLET | Refills: 2 | Status: SHIPPED | OUTPATIENT
Start: 2022-09-23

## 2022-09-23 RX ORDER — DEXAMETHASONE SODIUM PHOSPHATE 4 MG/ML
4 INJECTION, SOLUTION INTRA-ARTICULAR; INTRALESIONAL; INTRAMUSCULAR; INTRAVENOUS; SOFT TISSUE ONCE
Status: COMPLETED | OUTPATIENT
Start: 2022-09-23 | End: 2022-09-23

## 2022-09-23 RX ORDER — KETOROLAC TROMETHAMINE 30 MG/ML
60 INJECTION, SOLUTION INTRAMUSCULAR; INTRAVENOUS ONCE
Status: COMPLETED | OUTPATIENT
Start: 2022-09-23 | End: 2022-09-23

## 2022-09-23 RX ADMIN — KETOROLAC TROMETHAMINE 60 MG: 30 INJECTION, SOLUTION INTRAMUSCULAR; INTRAVENOUS at 15:58

## 2022-09-23 RX ADMIN — DEXAMETHASONE SODIUM PHOSPHATE 4 MG: 4 INJECTION, SOLUTION INTRA-ARTICULAR; INTRALESIONAL; INTRAMUSCULAR; INTRAVENOUS; SOFT TISSUE at 15:57

## 2022-09-23 NOTE — PROGRESS NOTES
Chief Complaint   Patient presents with    Headache     States she has had an ongoing headache x 3 weeks . .. states she is using Ibuprofen/Tylenol combo which helps cut the edge . .. if laying down things are better . . halo type headache          HPI:       is a 69 y.o. female. Moved here from [place]. Originally from [place]. 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 She was treated for Lyme's Disease previously and now follows with Dr. Kaleb Chapin at [facility]. Anxiety/Depression: Has had it her all life. She did some binge eating in the past to cope. Also previous ETOH issues (hx DUI). Previously saw LYDIA Padilla. She completed rehab at the [facility] in Spring 2019. Previously on Naltrexone. Currently on Celexa. New Issues:  She has had a headache for 3-4 weeks. Constant. Feels like a \"squeezing halo\". She is not prone to headaches and has never experienced anything like this. She reports sensitivity to sounds and mild nausea. Denies fever, chills or systemic symptoms. Denies double vision or vision changes. Denies changes to gait. She tried cutting back on caffeine when her headache started. This did not help. No known recent viral infections. She has been taking Ibuprofen and Tylenol in combination at home which only takes the edge off. Pain is worse with sitting up and exerting herself. Denies head injuries. Her boyfriend denies changes in personality or mentation. Allergies   Allergen Reactions    Codeine Other (comments) and Nausea and Vomiting     Flu like symptoms  Flu like symptoms       Current Outpatient Medications   Medication Sig    alendronate (FOSAMAX) 70 mg tablet Take 1 Tablet by mouth every seven (7) days. Needs visit for more fills    citalopram (CELEXA) 20 mg tablet TAKE 1 TABLET BY MOUTH EVERY DAY. Due for visit for more fills.     estradioL (ESTRACE) 0.01 % (0.1 mg/gram) vaginal cream APPLY VAGINALLY TO THE AFFECTED AREA 2 TIMES EVERY WEEK    valACYclovir (Valtrex) 500 mg tablet Take 1 Tab by mouth two (2) times daily as needed (Cold sores). meclizine (ANTIVERT) 25 mg tablet TAKE 1 TABLET BY MOUTH THREE TIMES DAILY AS NEEDED FOR VERTIGO FOR UP TO 10 DAYS    mupirocin (BACTROBAN) 2 % ointment Apply  to affected area daily. ondansetron (ZOFRAN ODT) 4 mg disintegrating tablet Take 1 Tab by mouth every eight (8) hours as needed for Nausea. No current facility-administered medications for this visit. Past Medical History:   Diagnosis Date    Menopause        Past Surgical History:   Procedure Laterality Date    HX KNEE REPLACEMENT Left 05/05/2009       Social History     Socioeconomic History    Marital status: SINGLE   Tobacco Use    Smoking status: Never    Smokeless tobacco: Never   Vaping Use    Vaping Use: Never used   Substance and Sexual Activity    Alcohol use: No    Drug use: No    Sexual activity: Yes     Partners: Male   Other Topics Concern     Service No    Blood Transfusions No    Caffeine Concern No    Occupational Exposure No    Hobby Hazards No    Sleep Concern No    Stress Concern No    Weight Concern No    Special Diet No    Back Care Yes    Exercise Yes    Bike Helmet Yes    Seat Belt Yes    Self-Exams Yes       Family History   Problem Relation Age of Onset    Heart Attack Father     Cancer Sister        Above history reviewed. ROS:  Denies fever, chills, cough, chest pain, SOB,  nausea, vomiting, or diarrhea. Denies wt loss, wt gain, hemoptysis, hematochezia or melena. Physical Examination:    /70 (BP 1 Location: Left arm, BP Patient Position: Sitting)   Pulse 70   Temp 97.8 °F (36.6 °C) (Temporal)   Resp 18   Ht 5' 4\" (1.626 m)   Wt 128 lb 12.8 oz (58.4 kg)   SpO2 98%   BMI 22.11 kg/m²     General: Alert and Ox3, Fluent speech  HEENT:  PERRLA, EOM intact, TMs, turbinates, pharynx normal.  No thyromegaly. No cervical adenopathy.   Neck:  Supple, no adenopathy, JVD, mass or bruit  Chest:  Clear to Ausculation, without wheezes, rales, rubs or ronchi  Cardiac: RRR  Abdomen:  +BS, soft, nontender without palpable HSM  Extremities:  No cyanosis, clubbing or edema  Neurologic:  Ambulatory without assist, CN 2-12 grossly intact. Moves all extremities. Skin: no rash  Lymphadenopathy: no cervical or supraclavicular nodes    ASSESSMENT AND PLAN:     1. Acute intractable tension-type headache  Unable to abort headache at home with Ibuprofen/Tylenol combination  Giving IM steroids and NSAIDS today  Obtaining head CT  Checking labs  Would consider preventative medication or neurology consult if not improved   - CBC WITH AUTOMATED DIFF; Future  - METABOLIC PANEL, COMPREHENSIVE; Future  - TSH 3RD GENERATION; Future  - SED RATE (ESR); Future  - C REACTIVE PROTEIN, QT; Future  - dexamethasone (DECADRON) 4 mg/mL injection 4 mg  - ketorolac tromethamine (TORADOL) 60 mg/2 mL injection 60 mg  - CT HEAD WO CONT; Future  - SUMAtriptan (IMITREX) 100 mg tablet; Take 1 Tablet by mouth once over twenty-four (24) hours. Dispense: 10 Tablet; Refill: 2  - C REACTIVE PROTEIN, QT  - SED RATE (ESR)  - TSH 3RD GENERATION  - METABOLIC PANEL, COMPREHENSIVE  - CBC WITH AUTOMATED DIFF    2. Encounter for lipid screening for cardiovascular disease  - LIPID PANEL;  Future  - LIPID PANEL     RTC PRN    Kristine Zaman NP

## 2022-09-23 NOTE — PROGRESS NOTES
Naif Rodriguez is a 69 y.o. female presenting for/with:    No chief complaint on file. There were no vitals taken for this visit. Pain Scale: /10  Pain Location:     1. \"Have you been to the ER, urgent care clinic since your last visit? Hospitalized since your last visit? \" No    2. \"Have you seen or consulted any other health care providers outside of the [address] [place] [address] since your last visit? \" No     3. For patients aged 39-70: Has the patient had a colonoscopy / FIT/ Cologuard? Yes - Care Gap present. Most recent result on file      If the patient is female:    4. For patients aged 41-77: Has the patient had a mammogram within the past 2 years? Yes - Care Gap present. Most recent result on file      5. For patients aged 21-65: Has the patient had a pap smear? Yes - Care Gap present. Most recent result on file          Patient    Learning Assessment 8/27/2021   PRIMARY LEARNER Patient   PRIMARY LANGUAGE ENGLISH   LEARNER PREFERENCE PRIMARY DEMONSTRATION     -   ANSWERED BY patient   RELATIONSHIP SELF     Fall Risk Assessment, last 12 mths 8/27/2021   Able to walk? Yes   Fall in past 12 months? 0   Do you feel unsteady? 0   Are you worried about falling 0   Is TUG test greater than 12 seconds? -   Is the gait abnormal? -   Number of falls in past 12 months -   Fall with injury? -       3 most recent PHQ Screens 8/27/2021   Little interest or pleasure in doing things Not at all   Feeling down, depressed, irritable, or hopeless Not at all   Total Score PHQ 2 0     Abuse Screening Questionnaire 8/27/2021   Do you ever feel afraid of your partner? N   Are you in a relationship with someone who physically or mentally threatens you? N   Is it safe for you to go home?  Y       ADL Assessment 8/27/2021   Feeding yourself No Help Needed   Getting from bed to chair No Help Needed   Getting dressed No Help Needed   Bathing or showering No Help Needed   Walk across the room (includes cane/walker) No Help Needed Using the telphone No Help Needed   Taking your medications No Help Needed   Preparing meals No Help Needed   Managing money (expenses/bills) No Help Needed   Moderately strenuous housework (laundry) No Help Needed   Shopping for personal items (toiletries/medicines) No Help Needed   Shopping for groceries No Help Needed   Driving No Help Needed   Climbing a flight of stairs No Help Needed   Getting to places beyond walking distances No Help Needed      Advance Care Planning 8/27/2021   Patient's Healthcare Decision Maker is: [facility] Yes, not on file

## 2022-09-24 LAB
ALBUMIN SERPL-MCNC: 3.6 G/DL (ref 3.5–5)
ALBUMIN/GLOB SERPL: 1.1 {RATIO} (ref 1.1–2.2)
ALP SERPL-CCNC: 65 U/L (ref 45–117)
ALT SERPL-CCNC: 32 U/L (ref 12–78)
ANION GAP SERPL CALC-SCNC: 5 MMOL/L (ref 5–15)
AST SERPL-CCNC: 22 U/L (ref 15–37)
BASOPHILS # BLD: 0.1 K/UL (ref 0–0.1)
BASOPHILS NFR BLD: 1 % (ref 0–1)
BILIRUB SERPL-MCNC: 0.1 MG/DL (ref 0.2–1)
BUN SERPL-MCNC: 10 MG/DL (ref 6–20)
BUN/CREAT SERPL: 16 (ref 12–20)
CALCIUM SERPL-MCNC: 9.2 MG/DL (ref 8.5–10.1)
CHLORIDE SERPL-SCNC: 104 MMOL/L (ref 97–108)
CHOLEST SERPL-MCNC: 262 MG/DL
CO2 SERPL-SCNC: 31 MMOL/L (ref 21–32)
CREAT SERPL-MCNC: 0.63 MG/DL (ref 0.55–1.02)
CRP SERPL-MCNC: <0.29 MG/DL (ref 0–0.6)
DIFFERENTIAL METHOD BLD: NORMAL
EOSINOPHIL # BLD: 0.1 K/UL (ref 0–0.4)
EOSINOPHIL NFR BLD: 1 % (ref 0–7)
ERYTHROCYTE [DISTWIDTH] IN BLOOD BY AUTOMATED COUNT: 13.9 % (ref 11.5–14.5)
ERYTHROCYTE [SEDIMENTATION RATE] IN BLOOD: 8 MM/HR (ref 0–30)
GLOBULIN SER CALC-MCNC: 3.4 G/DL (ref 2–4)
GLUCOSE SERPL-MCNC: 93 MG/DL (ref 65–100)
HCT VFR BLD AUTO: 37.9 % (ref 35–47)
HDLC SERPL-MCNC: 119 MG/DL
HDLC SERPL: 2.2 {RATIO} (ref 0–5)
HGB BLD-MCNC: 12.4 G/DL (ref 11.5–16)
IMM GRANULOCYTES # BLD AUTO: 0 K/UL (ref 0–0.04)
IMM GRANULOCYTES NFR BLD AUTO: 0 % (ref 0–0.5)
LDLC SERPL CALC-MCNC: 127.4 MG/DL (ref 0–100)
LYMPHOCYTES # BLD: 1.7 K/UL (ref 0.8–3.5)
LYMPHOCYTES NFR BLD: 33 % (ref 12–49)
MCH RBC QN AUTO: 31.2 PG (ref 26–34)
MCHC RBC AUTO-ENTMCNC: 32.7 G/DL (ref 30–36.5)
MCV RBC AUTO: 95.2 FL (ref 80–99)
MONOCYTES # BLD: 0.4 K/UL (ref 0–1)
MONOCYTES NFR BLD: 9 % (ref 5–13)
NEUTS SEG # BLD: 2.9 K/UL (ref 1.8–8)
NEUTS SEG NFR BLD: 56 % (ref 32–75)
NRBC # BLD: 0 K/UL (ref 0–0.01)
NRBC BLD-RTO: 0 PER 100 WBC
PLATELET # BLD AUTO: 369 K/UL (ref 150–400)
PMV BLD AUTO: 9.1 FL (ref 8.9–12.9)
POTASSIUM SERPL-SCNC: 4.2 MMOL/L (ref 3.5–5.1)
PROT SERPL-MCNC: 7 G/DL (ref 6.4–8.2)
RBC # BLD AUTO: 3.98 M/UL (ref 3.8–5.2)
SODIUM SERPL-SCNC: 140 MMOL/L (ref 136–145)
TRIGL SERPL-MCNC: 78 MG/DL (ref ?–150)
TSH SERPL DL<=0.05 MIU/L-ACNC: 1.79 UIU/ML (ref 0.36–3.74)
VLDLC SERPL CALC-MCNC: 15.6 MG/DL
WBC # BLD AUTO: 5.1 K/UL (ref 3.6–11)

## 2022-09-26 ENCOUNTER — PATIENT MESSAGE (OUTPATIENT)
Dept: FAMILY MEDICINE CLINIC | Age: 67
End: 2022-09-26

## 2022-09-26 NOTE — PROGRESS NOTES
Inflammatory factors are normal.  Thyroid level is good. Cholesterol is higher than last check. Watch for foods high in cholesterol. Liver and kidneys are good. CBC not concerning for infection. Continue plan for head CT.

## 2022-09-29 ENCOUNTER — HOSPITAL ENCOUNTER (OUTPATIENT)
Dept: CT IMAGING | Age: 67
Discharge: HOME | End: 2022-09-29
Payer: MEDICARE

## 2022-09-29 DIAGNOSIS — G44.201 ACUTE INTRACTABLE TENSION-TYPE HEADACHE: ICD-10-CM

## 2022-09-29 PROCEDURE — 70450 CT HEAD/BRAIN W/O DYE: CPT

## 2022-09-30 NOTE — TELEPHONE ENCOUNTER
Cedric Arteaga, LPN 7/00/8921 7:08 PM EDT      ----- Message -----  From: Evelina Mueller  Sent: 9/28/2022 1:58 PM EDT  To: [facility]  Subject: Headaches     Is there a different medication I could try other than Sumatriptan? It helps with the headache, but leaves me feeling very foggy, drowsy and 'out of sync'.  Thanks so much, Erica Chiu

## 2022-11-28 DIAGNOSIS — M81.0 AGE-RELATED OSTEOPOROSIS WITHOUT CURRENT PATHOLOGICAL FRACTURE: Primary | ICD-10-CM

## 2022-12-01 DIAGNOSIS — M81.0 AGE-RELATED OSTEOPOROSIS WITHOUT CURRENT PATHOLOGICAL FRACTURE: ICD-10-CM

## 2022-12-01 RX ORDER — ALENDRONATE SODIUM 70 MG/1
TABLET ORAL
Qty: 12 TABLET | Refills: 0 | Status: SHIPPED | OUTPATIENT
Start: 2022-12-01

## 2023-05-22 RX ORDER — ESTRADIOL 0.1 MG/G
CREAM VAGINAL
COMMUNITY
Start: 2022-09-06

## 2023-05-22 RX ORDER — VALACYCLOVIR HYDROCHLORIDE 500 MG/1
500 TABLET, FILM COATED ORAL 2 TIMES DAILY PRN
COMMUNITY
Start: 2022-11-14

## 2023-05-22 RX ORDER — ALENDRONATE SODIUM 70 MG/1
1 TABLET ORAL
COMMUNITY
Start: 2023-03-03

## 2023-05-22 RX ORDER — SUMATRIPTAN 100 MG/1
100 TABLET, FILM COATED ORAL
COMMUNITY
Start: 2022-09-23

## 2023-05-22 RX ORDER — CITALOPRAM 20 MG/1
1 TABLET ORAL DAILY
COMMUNITY
Start: 2022-12-05

## 2023-05-22 RX ORDER — MECLIZINE HYDROCHLORIDE 25 MG/1
TABLET ORAL
COMMUNITY
Start: 2020-10-21

## 2023-10-17 ENCOUNTER — PATIENT MESSAGE (OUTPATIENT)
Age: 68
End: 2023-10-17

## 2023-10-17 RX ORDER — ALENDRONATE SODIUM 70 MG/1
70 TABLET ORAL
Qty: 4 TABLET | Refills: 0 | Status: SHIPPED | OUTPATIENT
Start: 2023-10-17

## 2023-10-17 NOTE — TELEPHONE ENCOUNTER
From: Malika Barraza  To: Kathie Carlin  Sent: 10/17/2023 10:20 AM EDT  Subject: Refill alendronate? Hi! Trying to refill Alendronate but no luck. Can you help? Thanks so much :).

## 2023-11-14 RX ORDER — ALENDRONATE SODIUM 70 MG/1
TABLET ORAL
Qty: 4 TABLET | Refills: 0 | OUTPATIENT
Start: 2023-11-14

## 2023-12-15 RX ORDER — CITALOPRAM 20 MG/1
20 TABLET ORAL DAILY
Qty: 30 TABLET | Refills: 0 | Status: SHIPPED | OUTPATIENT
Start: 2023-12-15

## 2024-01-15 RX ORDER — CITALOPRAM 20 MG/1
20 TABLET ORAL DAILY
Qty: 30 TABLET | Refills: 0 | OUTPATIENT
Start: 2024-01-15

## 2024-01-16 ENCOUNTER — TELEPHONE (OUTPATIENT)
Age: 69
End: 2024-01-16

## 2024-01-16 NOTE — TELEPHONE ENCOUNTER
----- Message from Lay Olivarez sent at 1/16/2024  9:54 AM EST -----  Subject: Message to Provider    QUESTIONS  Information for Provider? pt says she needs a phone call appt with her pcp   (not virtual)  ---------------------------------------------------------------------------  --------------  CALL BACK INFO  [phone]; OK to leave message on voicemail  ---------------------------------------------------------------------------  --------------  SCRIPT ANSWERS  Relationship to Patient? Self

## 2024-04-30 ENCOUNTER — TELEPHONE (OUTPATIENT)
Age: 69
End: 2024-04-30

## 2025-07-29 ENCOUNTER — HOSPITAL ENCOUNTER (OUTPATIENT)
Facility: HOSPITAL | Age: 70
Discharge: HOME | End: 2025-08-01
Payer: COMMERCIAL

## 2025-07-29 ENCOUNTER — HOSPITAL ENCOUNTER (OUTPATIENT)
Facility: HOSPITAL | Age: 70
Discharge: HOME | End: 2025-08-01

## 2025-07-29 DIAGNOSIS — Z78.0 ASYMPTOMATIC MENOPAUSAL STATE: ICD-10-CM

## 2025-07-29 DIAGNOSIS — Z12.31 ENCOUNTER FOR SCREENING MAMMOGRAM FOR MALIGNANT NEOPLASM OF BREAST: ICD-10-CM

## 2025-07-29 DIAGNOSIS — Z00.00 ENCOUNTER FOR GENERAL ADULT MEDICAL EXAMINATION WITHOUT ABNORMAL FINDINGS: ICD-10-CM

## 2025-07-29 PROCEDURE — 77080 DXA BONE DENSITY AXIAL: CPT

## 2025-07-29 PROCEDURE — 75571 CT HRT W/O DYE W/CA TEST: CPT

## 2025-07-29 PROCEDURE — 77063 BREAST TOMOSYNTHESIS BI: CPT
